# Patient Record
Sex: FEMALE | Race: WHITE | HISPANIC OR LATINO | URBAN - METROPOLITAN AREA
[De-identification: names, ages, dates, MRNs, and addresses within clinical notes are randomized per-mention and may not be internally consistent; named-entity substitution may affect disease eponyms.]

---

## 2018-07-24 ENCOUNTER — INPATIENT (INPATIENT)
Facility: HOSPITAL | Age: 48
LOS: 1 days | Discharge: HOME | DRG: 988 | End: 2018-07-26
Attending: INTERNAL MEDICINE | Admitting: INTERNAL MEDICINE
Payer: SELF-PAY

## 2018-07-24 VITALS
OXYGEN SATURATION: 100 % | HEIGHT: 66 IN | RESPIRATION RATE: 18 BRPM | SYSTOLIC BLOOD PRESSURE: 108 MMHG | TEMPERATURE: 98 F | HEART RATE: 70 BPM | WEIGHT: 154.98 LBS | DIASTOLIC BLOOD PRESSURE: 71 MMHG

## 2018-07-24 DIAGNOSIS — D64.9 ANEMIA, UNSPECIFIED: ICD-10-CM

## 2018-07-24 DIAGNOSIS — Z29.9 ENCOUNTER FOR PROPHYLACTIC MEASURES, UNSPECIFIED: ICD-10-CM

## 2018-07-24 DIAGNOSIS — N84.0 POLYP OF CORPUS UTERI: Chronic | ICD-10-CM

## 2018-07-24 DIAGNOSIS — N20.0 CALCULUS OF KIDNEY: ICD-10-CM

## 2018-07-24 DIAGNOSIS — N13.9 OBSTRUCTIVE AND REFLUX UROPATHY, UNSPECIFIED: ICD-10-CM

## 2018-07-24 DIAGNOSIS — E21.3 HYPERPARATHYROIDISM, UNSPECIFIED: ICD-10-CM

## 2018-07-24 DIAGNOSIS — D50.8 OTHER IRON DEFICIENCY ANEMIAS: ICD-10-CM

## 2018-07-24 LAB
ALBUMIN SERPL ELPH-MCNC: 3.7 G/DL — SIGNIFICANT CHANGE UP (ref 3.5–5)
ALP SERPL-CCNC: 118 U/L — SIGNIFICANT CHANGE UP (ref 40–120)
ALT FLD-CCNC: 25 U/L DA — SIGNIFICANT CHANGE UP (ref 10–60)
ANION GAP SERPL CALC-SCNC: 7 MMOL/L — SIGNIFICANT CHANGE UP (ref 5–17)
APPEARANCE UR: CLEAR — SIGNIFICANT CHANGE UP
APTT BLD: 23.7 SEC — LOW (ref 27.5–37.4)
AST SERPL-CCNC: 19 U/L — SIGNIFICANT CHANGE UP (ref 10–40)
BASOPHILS # BLD AUTO: 0.1 K/UL — SIGNIFICANT CHANGE UP (ref 0–0.2)
BASOPHILS # BLD AUTO: 0.1 K/UL — SIGNIFICANT CHANGE UP (ref 0–0.2)
BASOPHILS NFR BLD AUTO: 0.4 % — SIGNIFICANT CHANGE UP (ref 0–2)
BASOPHILS NFR BLD AUTO: 0.6 % — SIGNIFICANT CHANGE UP (ref 0–2)
BILIRUB SERPL-MCNC: 0.5 MG/DL — SIGNIFICANT CHANGE UP (ref 0.2–1.2)
BILIRUB UR-MCNC: NEGATIVE — SIGNIFICANT CHANGE UP
BUN SERPL-MCNC: 14 MG/DL — SIGNIFICANT CHANGE UP (ref 7–18)
CALCIUM SERPL-MCNC: 11.1 MG/DL — HIGH (ref 8.4–10.5)
CHLORIDE SERPL-SCNC: 108 MMOL/L — SIGNIFICANT CHANGE UP (ref 96–108)
CO2 SERPL-SCNC: 24 MMOL/L — SIGNIFICANT CHANGE UP (ref 22–31)
COLOR SPEC: YELLOW — SIGNIFICANT CHANGE UP
CREAT SERPL-MCNC: 0.77 MG/DL — SIGNIFICANT CHANGE UP (ref 0.5–1.3)
DIFF PNL FLD: ABNORMAL
EOSINOPHIL # BLD AUTO: 0.1 K/UL — SIGNIFICANT CHANGE UP (ref 0–0.5)
EOSINOPHIL # BLD AUTO: 0.3 K/UL — SIGNIFICANT CHANGE UP (ref 0–0.5)
EOSINOPHIL NFR BLD AUTO: 0.7 % — SIGNIFICANT CHANGE UP (ref 0–6)
EOSINOPHIL NFR BLD AUTO: 2.6 % — SIGNIFICANT CHANGE UP (ref 0–6)
GLUCOSE SERPL-MCNC: 120 MG/DL — HIGH (ref 70–99)
GLUCOSE UR QL: NEGATIVE — SIGNIFICANT CHANGE UP
HCG SERPL-ACNC: <1 MIU/ML — SIGNIFICANT CHANGE UP
HCT VFR BLD CALC: 24.4 % — LOW (ref 34.5–45)
HCT VFR BLD CALC: 25.9 % — LOW (ref 34.5–45)
HGB BLD-MCNC: 6.8 G/DL — CRITICAL LOW (ref 11.5–15.5)
HGB BLD-MCNC: 7.4 G/DL — LOW (ref 11.5–15.5)
HIV 1+2 AB+HIV1 P24 AG SERPL QL IA: SIGNIFICANT CHANGE UP
INR BLD: 1.04 RATIO — SIGNIFICANT CHANGE UP (ref 0.88–1.16)
IRON SATN MFR SERPL: 13 UG/DL — LOW (ref 40–150)
IRON SATN MFR SERPL: 3 % — LOW (ref 15–50)
KETONES UR-MCNC: NEGATIVE — SIGNIFICANT CHANGE UP
LEUKOCYTE ESTERASE UR-ACNC: NEGATIVE — SIGNIFICANT CHANGE UP
LYMPHOCYTES # BLD AUTO: 1.8 K/UL — SIGNIFICANT CHANGE UP (ref 1–3.3)
LYMPHOCYTES # BLD AUTO: 15 % — SIGNIFICANT CHANGE UP (ref 13–44)
LYMPHOCYTES # BLD AUTO: 2.8 K/UL — SIGNIFICANT CHANGE UP (ref 1–3.3)
LYMPHOCYTES # BLD AUTO: 28.8 % — SIGNIFICANT CHANGE UP (ref 13–44)
MCHC RBC-ENTMCNC: 17.7 PG — LOW (ref 27–34)
MCHC RBC-ENTMCNC: 18.8 PG — LOW (ref 27–34)
MCHC RBC-ENTMCNC: 27.9 GM/DL — LOW (ref 32–36)
MCHC RBC-ENTMCNC: 28.6 GM/DL — LOW (ref 32–36)
MCV RBC AUTO: 63.4 FL — LOW (ref 80–100)
MCV RBC AUTO: 65.7 FL — LOW (ref 80–100)
MONOCYTES # BLD AUTO: 0.6 K/UL — SIGNIFICANT CHANGE UP (ref 0–0.9)
MONOCYTES # BLD AUTO: 0.9 K/UL — SIGNIFICANT CHANGE UP (ref 0–0.9)
MONOCYTES NFR BLD AUTO: 6 % — SIGNIFICANT CHANGE UP (ref 2–14)
MONOCYTES NFR BLD AUTO: 7.2 % — SIGNIFICANT CHANGE UP (ref 2–14)
NEUTROPHILS # BLD AUTO: 6.1 K/UL — SIGNIFICANT CHANGE UP (ref 1.8–7.4)
NEUTROPHILS # BLD AUTO: 9.4 K/UL — HIGH (ref 1.8–7.4)
NEUTROPHILS NFR BLD AUTO: 62 % — SIGNIFICANT CHANGE UP (ref 43–77)
NEUTROPHILS NFR BLD AUTO: 76.7 % — SIGNIFICANT CHANGE UP (ref 43–77)
NITRITE UR-MCNC: NEGATIVE — SIGNIFICANT CHANGE UP
PH UR: 6.5 — SIGNIFICANT CHANGE UP (ref 5–8)
PLATELET # BLD AUTO: 396 K/UL — SIGNIFICANT CHANGE UP (ref 150–400)
PLATELET # BLD AUTO: 459 K/UL — HIGH (ref 150–400)
POTASSIUM SERPL-MCNC: 4 MMOL/L — SIGNIFICANT CHANGE UP (ref 3.5–5.3)
POTASSIUM SERPL-SCNC: 4 MMOL/L — SIGNIFICANT CHANGE UP (ref 3.5–5.3)
PROT SERPL-MCNC: 7.7 G/DL — SIGNIFICANT CHANGE UP (ref 6–8.3)
PROT UR-MCNC: NEGATIVE — SIGNIFICANT CHANGE UP
PROTHROM AB SERPL-ACNC: 11.3 SEC — SIGNIFICANT CHANGE UP (ref 9.8–12.7)
RBC # BLD: 3.85 M/UL — SIGNIFICANT CHANGE UP (ref 3.8–5.2)
RBC # BLD: 3.94 M/UL — SIGNIFICANT CHANGE UP (ref 3.8–5.2)
RBC # FLD: 15.5 % — HIGH (ref 10.3–14.5)
RBC # FLD: 16.8 % — HIGH (ref 10.3–14.5)
SODIUM SERPL-SCNC: 139 MMOL/L — SIGNIFICANT CHANGE UP (ref 135–145)
SP GR SPEC: 1.01 — SIGNIFICANT CHANGE UP (ref 1.01–1.02)
TIBC SERPL-MCNC: 468 UG/DL — HIGH (ref 250–450)
TSH SERPL-MCNC: 0.6 UU/ML — SIGNIFICANT CHANGE UP (ref 0.34–4.82)
UIBC SERPL-MCNC: 455 UG/DL — HIGH (ref 110–370)
UROBILINOGEN FLD QL: NEGATIVE — SIGNIFICANT CHANGE UP
WBC # BLD: 12.2 K/UL — HIGH (ref 3.8–10.5)
WBC # BLD: 9.9 K/UL — SIGNIFICANT CHANGE UP (ref 3.8–10.5)
WBC # FLD AUTO: 12.2 K/UL — HIGH (ref 3.8–10.5)
WBC # FLD AUTO: 9.9 K/UL — SIGNIFICANT CHANGE UP (ref 3.8–10.5)

## 2018-07-24 PROCEDURE — 99285 EMERGENCY DEPT VISIT HI MDM: CPT | Mod: 25

## 2018-07-24 PROCEDURE — 99223 1ST HOSP IP/OBS HIGH 75: CPT | Mod: GC

## 2018-07-24 PROCEDURE — 74177 CT ABD & PELVIS W/CONTRAST: CPT | Mod: 26

## 2018-07-24 PROCEDURE — 71045 X-RAY EXAM CHEST 1 VIEW: CPT | Mod: 26

## 2018-07-24 RX ORDER — SODIUM CHLORIDE 9 MG/ML
3 INJECTION INTRAMUSCULAR; INTRAVENOUS; SUBCUTANEOUS ONCE
Qty: 0 | Refills: 0 | Status: COMPLETED | OUTPATIENT
Start: 2018-07-24 | End: 2018-07-24

## 2018-07-24 RX ORDER — IRON SUCROSE 20 MG/ML
200 INJECTION, SOLUTION INTRAVENOUS
Qty: 0 | Refills: 0 | Status: DISCONTINUED | OUTPATIENT
Start: 2018-07-25 | End: 2018-07-25

## 2018-07-24 RX ORDER — MORPHINE SULFATE 50 MG/1
4 CAPSULE, EXTENDED RELEASE ORAL ONCE
Qty: 0 | Refills: 0 | Status: DISCONTINUED | OUTPATIENT
Start: 2018-07-24 | End: 2018-07-24

## 2018-07-24 RX ORDER — MORPHINE SULFATE 50 MG/1
6 CAPSULE, EXTENDED RELEASE ORAL ONCE
Qty: 0 | Refills: 0 | Status: DISCONTINUED | OUTPATIENT
Start: 2018-07-24 | End: 2018-07-24

## 2018-07-24 RX ORDER — MORPHINE SULFATE 50 MG/1
1 CAPSULE, EXTENDED RELEASE ORAL EVERY 4 HOURS
Qty: 0 | Refills: 0 | Status: DISCONTINUED | OUTPATIENT
Start: 2018-07-24 | End: 2018-07-26

## 2018-07-24 RX ORDER — TAMSULOSIN HYDROCHLORIDE 0.4 MG/1
0.4 CAPSULE ORAL DAILY
Qty: 0 | Refills: 0 | Status: DISCONTINUED | OUTPATIENT
Start: 2018-07-24 | End: 2018-07-25

## 2018-07-24 RX ORDER — FAMOTIDINE 10 MG/ML
20 INJECTION INTRAVENOUS
Qty: 0 | Refills: 0 | Status: DISCONTINUED | OUTPATIENT
Start: 2018-07-24 | End: 2018-07-26

## 2018-07-24 RX ORDER — ONDANSETRON 8 MG/1
4 TABLET, FILM COATED ORAL ONCE
Qty: 0 | Refills: 0 | Status: COMPLETED | OUTPATIENT
Start: 2018-07-24 | End: 2018-07-24

## 2018-07-24 RX ORDER — KETOROLAC TROMETHAMINE 30 MG/ML
15 SYRINGE (ML) INJECTION EVERY 6 HOURS
Qty: 0 | Refills: 0 | Status: DISCONTINUED | OUTPATIENT
Start: 2018-07-24 | End: 2018-07-26

## 2018-07-24 RX ORDER — SODIUM CHLORIDE 9 MG/ML
1000 INJECTION INTRAMUSCULAR; INTRAVENOUS; SUBCUTANEOUS
Qty: 0 | Refills: 0 | Status: DISCONTINUED | OUTPATIENT
Start: 2018-07-24 | End: 2018-07-26

## 2018-07-24 RX ORDER — SODIUM CHLORIDE 9 MG/ML
1000 INJECTION INTRAMUSCULAR; INTRAVENOUS; SUBCUTANEOUS ONCE
Qty: 0 | Refills: 0 | Status: COMPLETED | OUTPATIENT
Start: 2018-07-24 | End: 2018-07-24

## 2018-07-24 RX ADMIN — MORPHINE SULFATE 6 MILLIGRAM(S): 50 CAPSULE, EXTENDED RELEASE ORAL at 08:28

## 2018-07-24 RX ADMIN — SODIUM CHLORIDE 75 MILLILITER(S): 9 INJECTION INTRAMUSCULAR; INTRAVENOUS; SUBCUTANEOUS at 16:47

## 2018-07-24 RX ADMIN — FAMOTIDINE 20 MILLIGRAM(S): 10 INJECTION INTRAVENOUS at 22:13

## 2018-07-24 RX ADMIN — ONDANSETRON 4 MILLIGRAM(S): 8 TABLET, FILM COATED ORAL at 05:13

## 2018-07-24 RX ADMIN — MORPHINE SULFATE 4 MILLIGRAM(S): 50 CAPSULE, EXTENDED RELEASE ORAL at 05:15

## 2018-07-24 RX ADMIN — MORPHINE SULFATE 6 MILLIGRAM(S): 50 CAPSULE, EXTENDED RELEASE ORAL at 08:58

## 2018-07-24 RX ADMIN — MORPHINE SULFATE 4 MILLIGRAM(S): 50 CAPSULE, EXTENDED RELEASE ORAL at 06:07

## 2018-07-24 RX ADMIN — TAMSULOSIN HYDROCHLORIDE 0.4 MILLIGRAM(S): 0.4 CAPSULE ORAL at 12:35

## 2018-07-24 RX ADMIN — SODIUM CHLORIDE 3 MILLILITER(S): 9 INJECTION INTRAMUSCULAR; INTRAVENOUS; SUBCUTANEOUS at 05:05

## 2018-07-24 RX ADMIN — SODIUM CHLORIDE 75 MILLILITER(S): 9 INJECTION INTRAMUSCULAR; INTRAVENOUS; SUBCUTANEOUS at 14:03

## 2018-07-24 RX ADMIN — SODIUM CHLORIDE 1000 MILLILITER(S): 9 INJECTION INTRAMUSCULAR; INTRAVENOUS; SUBCUTANEOUS at 05:05

## 2018-07-24 NOTE — ED ADULT NURSE NOTE - ED STAT RN HANDOFF DETAILS
[id number] patient at 7 from HENRY Freedman schedule for CT scan. Patient c/o right sided pain on pain scale 8/10 Dr. Morales to order medication to be administered. Abdomen soft tender to palpation. Patient not admitted as yet continue to monitor patient.

## 2018-07-24 NOTE — ED ADULT NURSE NOTE - ED STAT RN HANDOFF DETAILS 2
Patient admitted to medicine 5S 515, report given to HENRY Conte. Patient s/p 1 unit of PRBC no reaction noted, B/P low patient asymptomatic Dr. Tafoya made aware. Patient to be transfer to floor by transporter in no acute distress.

## 2018-07-24 NOTE — H&P ADULT - PROBLEM SELECTOR PLAN 2
Patient p/w R flank pain + chills + vomiting  CT A/P: 0.4 cm distal right ureter stone causing moderate right   hydronephrosis. Small bilateral non-obstructing renal calculi measuring up to 0.3 cm in the right lower pole and 0.2 cm in the left lower pole. Several small renal cysts.  c/w Flomax + IV hydration + pain management  UA negative  Urology consult Patient p/w R flank pain + chills + vomiting  CT A/P: 0.4 cm distal right ureter stone causing moderate right   hydronephrosis. Small bilateral non-obstructing renal calculi measuring up to 0.3 cm in the right lower pole and 0.2 cm in the left lower pole. Several small renal cysts.  c/w Flomax + IV hydration + pain management  UA negative, isolated mild leukocytosis likely reactive; afebrile  Urology consult Patient p/w R flank pain + chills + vomiting  CT A/P: 0.4 cm distal right ureter stone causing moderate right   hydronephrosis. Small bilateral non-obstructing renal calculi measuring up to 0.3 cm in the right lower pole and 0.2 cm in the left lower pole. Several small renal cysts.  c/w Flomax + IV hydration + pain management  UA negative, isolated mild leukocytosis likely reactive; afebrile  Urology consulted

## 2018-07-24 NOTE — H&P ADULT - PROBLEM SELECTOR PLAN 1
Patient p/w fatigue, weakness, dyspnea on exertion for several months  Hx uterine polyps, heavy menstrual periods  H/H: 6.8/24.4; MCV: 63.4, MCH: 17.7 and RDW: 15.5: Iron deficiency anemia picture  Follow up anemia panel, FOBT  Will transfuse 1U PRBC  CT A/P: fibroid uterus

## 2018-07-24 NOTE — H&P ADULT - ASSESSMENT
46yo  female, PMHx uterine polyps (removed ~2yrs ago), anemia, hyperparathyroidism, ?neck clot (on Coumadin 5mg 15 yrs ago), who presented to ED c/o worsening right flank pain (9/10) and vomiting. Patient refers symptoms started 2 weeks ago, and worsened three days ago. Refers chills, diaphoresis, nausea/vomiting, and previous history of nephrolithiasis.      Patient admitted for symptomatic anemia and nephrolithiasis.

## 2018-07-24 NOTE — PATIENT PROFILE ADULT. - VISION (WITH CORRECTIVE LENSES IF THE PATIENT USUALLY WEARS THEM):
blurry vision/Partially impaired: cannot see medication labels or newsprint, but can see obstacles in path, and the surrounding layout; can count fingers at arm's length

## 2018-07-24 NOTE — CONSULT NOTE ADULT - ASSESSMENT
47 year old female right ureteral calculi causing moderate hydronephrosis   1. Cont flomax  2. Please send urine culture  3. Hydration  4. Strain urine  5. Anemia management as per medicine  6. Pt will need follow-up for surgical management of primary hyperparathyroidism 47 year old female right ureteral stone, bilateral intrarenal stones, right flank pain    1. Cont flomax  2. Please send urine culture  3. Hydration  4. Strain urine  5. Anemia management as per medicine  6. CT images reviewed  7. Labs reviewed  8. Case discussed with PA staff and medicine team  9. Given pain, will do ureteroscopy with possible laser lithotripsy, stone extraction, stent placement

## 2018-07-24 NOTE — ED PROVIDER NOTE - OBJECTIVE STATEMENT
48 y/o F pt with a significant PMHx of Anemia and Perithyroiditis, and no significant PSHx presents to ED with intermittent mild lower quadrant pain for x2 weeks. Patient reports pain being persistent and severe last night. Patient states she experienced nausea and vomiting x2, non bloody non bilious. Patient denies fever, chest pain, shortness of breath, diarrhea, dysuria urgency frequency, or any other complaints. Patient had a specific  [place]. Patient notes pain radiating to right proximal thigh. 48 y/o F pt with a significant PMHx of Anemia and Perithyroiditis, and no significant PSHx presents to ED with intermittent mild lower quadrant pain for x2 weeks. Patient reports pain being persistent and severe last night. Patient states she experienced nausea and vomiting x2, non bloody, non bilious. Patient denies fever, chest pain, shortness of breath, diarrhea, dysuria, urgency, frequency, or any other complaints. Patient had a specific  [place]iny[place]. Patient notes pain radiating to right proximal thigh.

## 2018-07-24 NOTE — H&P ADULT - ATTENDING COMMENTS
Patient seen and examined; Agree with PGY2 MAR A/P above with editing as needed. Discussed with MAR Dr. Ledesma    48yo  female, PMHx uterine polyps (removed ~2yrs ago), anemia, hyperparathyroidism, ?neck clot (on Coumadin 5mg 15 yrs ago), who presented to ED c/o worsening right flank pain (9/10) and vomiting. Patient refers symptoms started 2 weeks ago, and worsened three days ago. Refers chills, diaphoresis, nausea/vomiting, and previous history of nephrolithiasis. Denies fever, dysuria, hematuria, diarrhea. Patient mentions drinking 2L water daily at least and denies consumption of soda. Also mentions taking multivitamin supplements on a daily basis.     Upon further questioning, patient mentions being diagnosed with hyperparathyroidism (2 years ago) for which surgery was recommended, however, patient lost medical insurance and was unable to schedule surgery.    Patient endorses arthralgias, intermittent abdominal pain, bone pain as well. Regarding menstrual cycle, patient refers LMP 7 days ago, which usually last 8 days with heavy flow (2 pad packets/ 3 days). Refers weakness, fatigue, dizziness and SOB upon exertion. Last time patient followed up with PCP was 2 years ago. Patient currently on B12 supplements and multivitamins.    Her Right flank pain has resolved with Morphine in ED; She was found to have a low H/H; Hx Transfusion a year ago. Has heavy menstrual losses    FH/SH: As above;     Vital Signs Last 24 Hrs  T(C): 36.4 (24 Jul 2018 13:03), Max: 36.9 (24 Jul 2018 07:36)  T(F): 97.6 (24 Jul 2018 13:03), Max: 98.4 (24 Jul 2018 07:36)  HR: 69 (24 Jul 2018 13:03) (64 - 74)  BP: 94/45 (24 Jul 2018 13:03) (94/45 - 114/56)  RR: 18 (24 Jul 2018 13:03) (18 - 18)  SpO2: 99% (24 Jul 2018 13:03) (99% - 100%)    P/E:  Neuro: AAO x3; No focal; deficits  CVS: S1S2 present, regular  Resp: BLAE+, No wheeze or Rhonchi  GI: Soft, BS+, NT, ND Patient seen and examined; Agree with PGY2 MAR A/P above with editing as needed. Discussed with MAR Dr. Ledesma    46yo  female, PMHx uterine polyps (removed ~2yrs ago), anemia, hyperparathyroidism, ?neck clot (on Coumadin 5mg 15 yrs ago), who presented to ED c/o worsening right flank pain (9/10) and vomiting. Patient refers symptoms started 2 weeks ago, and worsened three days ago. Refers chills, diaphoresis, nausea/vomiting, and previous history of nephrolithiasis. Denies fever, dysuria, hematuria, diarrhea. Patient mentions drinking 2L water daily at least and denies consumption of soda. Also mentions taking multivitamin supplements on a daily basis.     Upon further questioning, patient mentions being diagnosed with hyperparathyroidism (2 years ago) for which surgery was recommended, however, patient lost medical insurance and was unable to schedule surgery.    Patient endorses arthralgias, intermittent abdominal pain, bone pain as well. Regarding menstrual cycle, patient refers LMP 7 days ago, which usually last 8 days with heavy flow (2 pad packets/ 3 days). Refers weakness, fatigue, dizziness and SOB upon exertion. Last time patient followed up with PCP was 2 years ago. Patient currently on B12 supplements and multivitamins.    Her Right flank pain has resolved with Morphine in ED; She was found to have a low H/H; Hx Transfusion a year ago. Has heavy menstrual losses    FH/SH: As above;     Vital Signs Last 24 Hrs  T(C): 36.4 (24 Jul 2018 13:03), Max: 36.9 (24 Jul 2018 07:36)  T(F): 97.6 (24 Jul 2018 13:03), Max: 98.4 (24 Jul 2018 07:36)  HR: 69 (24 Jul 2018 13:03) (64 - 74)  BP: 94/45 (24 Jul 2018 13:03) (94/45 - 114/56)  RR: 18 (24 Jul 2018 13:03) (18 - 18)  SpO2: 99% (24 Jul 2018 13:03) (99% - 100%)    P/E:  Neuro: AAO x3; No focal; deficits  CVS: S1S2 present, regular  Resp: BLAE+, No wheeze or Rhonchi  GI: Soft, BS+, NT, ND  Extr: No edema or calf tenderness B/L LE    Labs:                        6.8    12.2  )-----------( 459      ( 24 Jul 2018 05:08 )             24.4   07-24    139  |  108  |  14  ----------------------------<  120<H>  4.0   |  24  |  0.77    Ca    11.1<H>      24 Jul 2018 05:08    TPro  7.7  /  Alb  3.7  /  TBili  0.5  /  DBili  x   /  AST  19  /  ALT  25  /  AlkPhos  118  07-24    D/D;  Symptomatic Anemia due to likely Iron deficiency due to Menorrhagia due to excessive Menstrual loss  Right Ureteral obstructing stone with Moderate Hydronephrosis  Hypercalcemia and Renal Stones likely  due to Hyperparathyroidism   Overweight    Plan:  Medicine; Typed and crossed and already getting a unit of blood in ED  Iron studies send prior to Transfusion  Will place on Venofer after a unit PRBC  Transfuse only if H/H less than 8/25 after PRBC  Urology eval for Dr. Wei d/w COURTNEY Hughes  IVF post PRBC    Patient needs to follow up with Gyn as outpatient for possible Myomectomy which will likely cure her Iron deficiency  Patient lacks Insurance  Will d/w  to arrange outpatient appointment at [facility]. She will also need Parathyroidectomy.     Discussed with patient findings and plan of care  Discussed with HENRY Tipton  Discussed with LAYLA Ledesma Patient seen and examined; Agree with PGY2 MAR A/P above with editing as needed. Discussed with MAR Dr. Ledesma    46yo  female, PMHx uterine polyps (removed ~2yrs ago), anemia, hyperparathyroidism, ?neck clot (on Coumadin 5mg 15 yrs ago), who presented to ED c/o worsening right flank pain (9/10) and vomiting. Patient refers symptoms started 2 weeks ago, and worsened three days ago. Refers chills, diaphoresis, nausea/vomiting, and previous history of nephrolithiasis. Denies fever, dysuria, hematuria, diarrhea. Patient mentions drinking 2L water daily at least and denies consumption of soda. Also mentions taking multivitamin supplements on a daily basis.     Upon further questioning, patient mentions being diagnosed with hyperparathyroidism (2 years ago) for which surgery was recommended, however, patient lost medical insurance and was unable to schedule surgery.    Patient endorses arthralgias, intermittent abdominal pain, bone pain as well. Regarding menstrual cycle, patient refers LMP 7 days ago, which usually last 8 days with heavy flow (2 pad packets/ 3 days). Refers weakness, fatigue, dizziness and SOB upon exertion. Last time patient followed up with PCP was 2 years ago. Patient currently on B12 supplements and multivitamins.    Her Right flank pain has resolved with Morphine in ED; She was found to have a low H/H; Hx Transfusion a year ago. Has heavy menstrual losses    FH/SH: As above;     Vital Signs Last 24 Hrs  T(C): 36.4 (24 Jul 2018 13:03), Max: 36.9 (24 Jul 2018 07:36)  T(F): 97.6 (24 Jul 2018 13:03), Max: 98.4 (24 Jul 2018 07:36)  HR: 69 (24 Jul 2018 13:03) (64 - 74)  BP: 94/45 (24 Jul 2018 13:03) (94/45 - 114/56)  RR: 18 (24 Jul 2018 13:03) (18 - 18)  SpO2: 99% (24 Jul 2018 13:03) (99% - 100%)    P/E:  Neuro: AAO x3; No focal; deficits  CVS: S1S2 present, regular  Resp: BLAE+, No wheeze or Rhonchi  GI: Soft, BS+, NT, ND  Extr: No edema or calf tenderness B/L LE    Labs:                        6.8    12.2  )-----------( 459      ( 24 Jul 2018 05:08 )             24.4   07-24    139  |  108  |  14  ----------------------------<  120<H>  4.0   |  24  |  0.77    Ca    11.1<H>      24 Jul 2018 05:08    TPro  7.7  /  Alb  3.7  /  TBili  0.5  /  DBili  x   /  AST  19  /  ALT  25  /  AlkPhos  118  07-24    D/D;  Symptomatic Anemia due to likely Iron deficiency due to Menorrhagia due to excessive Menstrual loss  Right Ureteral obstructing stone with Moderate Hydronephrosis  Hypercalcemia and Renal Stones likely  due to Hyperparathyroidism   Leucocytosis reactive due to Anemia  Overweight    Plan:  Medicine; Typed and crossed and already getting a unit of blood in ED  Iron studies send prior to Transfusion  Will place on Venofer after a unit PRBC  Transfuse only if H/H less than 8/25 after PRBC  Urology eval for Dr. Wei d/w COURTNEY Hughes  IVF post PRBC    Patient needs to follow up with Gyn as outpatient for possible Myomectomy which will likely cure her Iron deficiency  Patient lacks Insurance  Will d/w  to arrange [facility] appointment [facility]. She will also need Parathyroidectomy.     Discussed with patient findings and plan of care  Discussed with HENRY Tipton  Discussed with LAYLA Ledesma

## 2018-07-24 NOTE — H&P ADULT - HISTORY OF PRESENT ILLNESS
46yo  female, PMHx uterine polyps (removed ~2yrs ago), anemia, hyperparathyroidism, ?neck clot (on Coumadin 5mg 15 yrs ago), who presented to ED c/o worsening right flank pain (9/10) and vomiting. Patient refers symptoms started 2 weeks ago, and worsened three days ago. Refers chills, diaphoresis, nausea/vomiting, and previous history of nephrolithiasis. Denies fever, dysuria, hematuria, diarrhea. Patient mentions drinking 2L water daily at least and denies consumption of soda. Also mentions taking multivitamin supplements on a daily basis. Upon further questioning, patient mentions being diagnosed with hyperparathyroidism (2 years ago) for which surgery was recommended, however, patient lost medical insurance and was unable to schedule surgery.  Patient endorses arthralgias, intermittent abdominal pain, bone pain as well. Regarding menstrual cycle, patient refers LMP 7 days ago, which usually last 8 days with heavy flow (2 pad packets/ 3 days). Refers weakness, fatigue, dizziness and SOB upon exertion. Last time patient followed up with PCP was 2 years ago. Patient currently on B12 supplements and multivitamins.

## 2018-07-24 NOTE — ED ADULT TRIAGE NOTE - CHIEF COMPLAINT QUOTE
pt stated she has right side abd pain going to the right side of her back and down her right leg that started 2 weeks and yesterday the pain got worse.

## 2018-07-24 NOTE — PROGRESS NOTE ADULT - SUBJECTIVE AND OBJECTIVE BOX
Surgery Pre-op Note    Preoperative Diagnosis: right ureteral calculus causing hydronephrosis    Planned Procedure: Cystoscopy, insertion of right ureteral stent                        6.8    12.2  )-----------( 459      ( 24 Jul 2018 05:08 )             24.4     07-24    139  |  108  |  14  ----------------------------<  120<H>  4.0   |  24  |  0.77    Ca    11.1<H>      24 Jul 2018 05:08    TPro  7.7  /  Alb  3.7  /  TBili  0.5  /  DBili  x   /  AST  19  /  ALT  25  /  AlkPhos  118  07-24    LIVER FUNCTIONS - ( 24 Jul 2018 05:08 )  Alb: 3.7 g/dL / Pro: 7.7 g/dL / ALK PHOS: 118 U/L / ALT: 25 U/L DA / AST: 19 U/L / GGT: x           PT/INR - ( 24 Jul 2018 09:19 )   PT: 11.3 sec;   INR: 1.04 ratio       PTT - ( 24 Jul 2018 09:19 )  PTT:23.7 sec    Type & Screen: ABO RH Interpretation: B POS (07.24.18 @ 09:29)    EKG: Ordered    CXR: Ordered

## 2018-07-24 NOTE — CONSULT NOTE ADULT - SUBJECTIVE AND OBJECTIVE BOX
Urology  Consultation Note    Patient is a 47y old  Female who presents with a chief complaint of right flank pain (2018 10:29)      HPI:   48 y/o female with significant PMH of hyperparathyroidism, kidney stones, uterine polyps and anemia presents with right flank pain x 2 weeks. She states the pain was 8/10 and has gotten worse recently. She also admits to generalized bone pain. She states she had chills and diaphoresis. Admits to nausea and vomiting.  She denies fever or blood in the urine. She was told about her hyperparathyroidism and was recommended surgery but did not have insurance to go on with her surgery. She also notes that she naturally passed her kidney stones in the past.       PAST MEDICAL & SURGICAL HISTORY:  Hyperparathyroidism  Uterine polyp  Anemia  Uterine polyp: uterine polyp removal      Allergies    No Known Allergies    MEDICATIONS  (STANDING):  sodium chloride 0.9%. 1000 milliLiter(s) (75 mL/Hr) IV Continuous <Continuous>  tamsulosin 0.4 milliGRAM(s) Oral daily    MEDICATIONS  (PRN):  ketorolac   Injectable 15 milliGRAM(s) IV Push every 6 hours PRN Moderate Pain (4 - 6)  morphine  - Injectable 1 milliGRAM(s) IV Push every 4 hours PRN Severe Pain (7 - 10)      Vital Signs Last 24 Hrs  T(C): 36.7 (2018 11:03), Max: 36.9 (2018 07:36)  T(F): 98 (2018 11:03), Max: 98.4 (2018 07:36)  HR: 64 (2018 11:03) (64 - 74)  BP: 103/48 (2018 11:03) (103/48 - 114/56)  BP(mean): --  RR: 18 (2018 11:03) (18 - 18)  SpO2: 100% (2018 11:03) (99% - 100%)    Physical Exam:  Gen: AAOx3, NAD  Abd: soft, non-distended, mildly tender on the right side, no rebound, no guarding  Back: no CVA tenderness bilaterally    Labs:                          6.8    12.2  )-----------( 459      ( 2018 05:08 )             24.4     07-24    139  |  108  |  14  ----------------------------<  120<H>  4.0   |  24  |  0.77    Ca    11.1<H>      2018 05:08    TPro  7.7  /  Alb  3.7  /  TBili  0.5  /  DBili  x   /  AST  19  /  ALT  25  /  AlkPhos  118  07-    PT/INR - ( 2018 09:19 )   PT: 11.3 sec;   INR: 1.04 ratio         PTT - ( 2018 09:19 )  PTT:23.7 sec  Urinalysis Basic - ( 2018 05:08 )    Color: Yellow / Appearance: Clear / S.015 / pH: x  Gluc: x / Ketone: Negative  / Bili: Negative / Urobili: Negative   Blood: x / Protein: Negative / Nitrite: Negative   Leuk Esterase: Negative / RBC: 2-5 /HPF / WBC 0-2 /HPF   Sq Epi: x / Non Sq Epi: x / Bacteria: x        Radiological Exams:  < from: CT Abdomen and Pelvis w/ Oral Cont and w/ IV Cont (18 @ 07:29) >    EXAM:  CT ABDOMEN AND PELVIS OC IC                            PROCEDURE DATE:  2018          INTERPRETATION:  CLINICAL INFORMATION: Right lower quadrant pain    COMPARISON: None    PROCEDURE:   CT of the Abdomen and Pelvis was performed with intravenous contrast.   Intravenous contrast: 90 ml Omnipaque 350. 10 ml discarded.  Oral contrast: positive contrast was administered.  Sagittal and coronal reformats were performed.    FINDINGS:    LOWER CHEST: Within normal limits.    LIVER: Withinnormal limits.  BILE DUCTS: Normal caliber.  GALLBLADDER: Within normal limits.  SPLEEN: Within normal limits.  PANCREAS: Within normal limits.  ADRENALS: Within normal limits.  KIDNEYS/URETERS: 0.4 cm distal right ureter stone causing moderate right  hydronephrosis. Additional small bilateral nonobstructing renal calculi   measuring up to 0.3 cm in the right lower pole and 0.2 cm in the left   lower pole. Several small renal cysts.    BLADDER: Within normal limits.  REPRODUCTIVE ORGANS: Fibroiduterus. No adnexal mass.    BOWEL: No bowel obstruction. Appendix normal.  PERITONEUM: No ascites.  VESSELS:  Within normal limits.  RETROPERITONEUM: No lymphadenopathy.    ABDOMINAL WALL: Within normal limits.  BONES: Within normal limits.    IMPRESSION: 0.4 cm distal right ureter stone causing moderate right   hydronephrosis.                     YANA ASKEW M.D., ATTENDING RADIOLOGIST  This document has been electronically signed. 2018  8:15AM                < end of copied text > Urology  Consultation Note    Patient is a 47y old  Female who presents with a chief complaint of right flank pain (2018 10:29)      HPI:   48 y/o female with significant PMH of hyperparathyroidism, kidney stones, uterine polyps and anemia presents with right flank pain x 2 weeks. She states the pain was 8/10 and has gotten worse recently. She also admits to generalized bone pain. She states she had chills and diaphoresis. Admits to nausea and vomiting.  She denies fever or blood in the urine. She was told about her hyperparathyroidism and was recommended surgery but did not have insurance. She also notes that she has passed kidney stones in the past.       PAST MEDICAL & SURGICAL HISTORY:  Hyperparathyroidism  Uterine polyp  Anemia  Kidney stones  Uterine polyp: uterine polyp removal    FH: No family history of stones  SH: Quit smoking 8 years ago    Allergies  No Known Allergies    MEDICATIONS  (STANDING):  sodium chloride 0.9%. 1000 milliLiter(s) (75 mL/Hr) IV Continuous <Continuous>  tamsulosin 0.4 milliGRAM(s) Oral daily    MEDICATIONS  (PRN):  ketorolac   Injectable 15 milliGRAM(s) IV Push every 6 hours PRN Moderate Pain (4 - 6)  morphine  - Injectable 1 milliGRAM(s) IV Push every 4 hours PRN Severe Pain (7 - 10)      Vital Signs Last 24 Hrs  T(C): 36.7 (2018 11:03), Max: 36.9 (2018 07:36)  T(F): 98 (2018 11:03), Max: 98.4 (2018 07:36)  HR: 64 (2018 11:03) (64 - 74)  BP: 103/48 (2018 11:03) (103/48 - 114/56)  BP(mean): --  RR: 18 (2018 11:03) (18 - 18)  SpO2: 100% (2018 11:03) (99% - 100%)        Labs:                          6.8    12.2  )-----------( 459      ( 2018 05:08 )             24.4     07-24    139  |  108  |  14  ----------------------------<  120<H>  4.0   |  24  |  0.77    Ca    11.1<H>      2018 05:08    TPro  7.7  /  Alb  3.7  /  TBili  0.5  /  DBili  x   /  AST  19  /  ALT  25  /  AlkPhos  118  07-24    PT/INR - ( 2018 09:19 )   PT: 11.3 sec;   INR: 1.04 ratio         PTT - ( 2018 09:19 )  PTT:23.7 sec  Urinalysis Basic - ( 2018 05:08 )    Color: Yellow / Appearance: Clear / S.015 / pH: x  Gluc: x / Ketone: Negative  / Bili: Negative / Urobili: Negative   Blood: x / Protein: Negative / Nitrite: Negative   Leuk Esterase: Negative / RBC: 2-5 /HPF / WBC 0-2 /HPF   Sq Epi: x / Non Sq Epi: x / Bacteria: x        Radiological Exams:  < from: CT Abdomen and Pelvis w/ Oral Cont and w/ IV Cont (18 @ 07:29) >    EXAM:  CT ABDOMEN AND PELVIS OC IC                            PROCEDURE DATE:  2018          INTERPRETATION:  CLINICAL INFORMATION: Right lower quadrant pain    COMPARISON: None    PROCEDURE:   CT of the Abdomen and Pelvis was performed with intravenous contrast.   Intravenous contrast: 90 ml Omnipaque 350. 10 ml discarded.  Oral contrast: positive contrast was administered.  Sagittal and coronal reformats were performed.    FINDINGS:    LOWER CHEST: Within normal limits.    LIVER: Withinnormal limits.  BILE DUCTS: Normal caliber.  GALLBLADDER: Within normal limits.  SPLEEN: Within normal limits.  PANCREAS: Within normal limits.  ADRENALS: Within normal limits.  KIDNEYS/URETERS: 0.4 cm distal right ureter stone causing moderate right  hydronephrosis. Additional small bilateral nonobstructing renal calculi   measuring up to 0.3 cm in the right lower pole and 0.2 cm in the left   lower pole. Several small renal cysts.    BLADDER: Within normal limits.  REPRODUCTIVE ORGANS: Fibroiduterus. No adnexal mass.    BOWEL: No bowel obstruction. Appendix normal.  PERITONEUM: No ascites.  VESSELS:  Within normal limits.  RETROPERITONEUM: No lymphadenopathy.    ABDOMINAL WALL: Within normal limits.  BONES: Within normal limits.    IMPRESSION: 0.4 cm distal right ureter stone causing moderate right   hydronephrosis.                     YANA ASKEW M.D., ATTENDING RADIOLOGIST  This document has been electronically signed. 2018  8:15AM                < end of copied text >

## 2018-07-24 NOTE — H&P ADULT - PROBLEM SELECTOR PLAN 3
Patient refers being diagnosed couple of years back, recommended to have surgery but got uninsured and was unable to follow up.  Might explain recurrence nephrolithiasis  Follow up PTH

## 2018-07-24 NOTE — PROGRESS NOTE ADULT - ASSESSMENT
47 year old female with moderate right hydronephrosis secondary to distal right ureteral calculus. Planned for OR 7/25 for cystoscopy and right ureteral stent insertion. Anemia.     - NPO after midnight  - patient received 1 unit PRBC, recommend transfusing at least 1 more unit prior to OR for optimization   - 1 U PRBC on hold for OR  - Monitor H/H, repeat CBC in AM  - EKG and CXR ordered, will follow results  - continue Flomax and IV hydration

## 2018-07-24 NOTE — H&P ADULT - NSHPSOCIALHISTORY_GEN_ALL_CORE
, Lives with children (30,28,15), works at restaurant 3 days a week, quitted smoking 8 yrs ago (3 cigarettes per day), drinks etoh socially

## 2018-07-24 NOTE — ED PROVIDER NOTE - MEDICAL DECISION MAKING DETAILS
Patient with RLQ pain, appendicitis vs ovarian pathophysiology. suspicion for ovarian torsion low given pt lack of distress and gradual onset of pain. Will obtain CT of abdomen/ pelvis, will reassess.

## 2018-07-24 NOTE — ED PROVIDER NOTE - PROGRESS NOTE DETAILS
Patient resting comfortably, feels mildly improved. Patient reports she had a blood transfusion 3 months ago at [facility]. Her anemia is believed ot be due to heavy periods. LMP 7/6, lasted 8 days. Has been feeling lightheaded when she stands up. Consents to blood transfusion. Patient signed out to Dr. Marsh. Awaiting result of CT abdomen/pelvis. Even if negative, will admit for blood transfusion.

## 2018-07-25 LAB
24R-OH-CALCIDIOL SERPL-MCNC: 19 NG/ML — LOW (ref 30–80)
ANION GAP SERPL CALC-SCNC: 7 MMOL/L — SIGNIFICANT CHANGE UP (ref 5–17)
BASOPHILS # BLD AUTO: 0.1 K/UL — SIGNIFICANT CHANGE UP (ref 0–0.2)
BASOPHILS NFR BLD AUTO: 1.1 % — SIGNIFICANT CHANGE UP (ref 0–2)
BUN SERPL-MCNC: 8 MG/DL — SIGNIFICANT CHANGE UP (ref 7–18)
CALCIUM SERPL-MCNC: 10 MG/DL — SIGNIFICANT CHANGE UP (ref 8.4–10.5)
CALCIUM SERPL-MCNC: 10.7 MG/DL — HIGH (ref 8.4–10.5)
CHLORIDE SERPL-SCNC: 113 MMOL/L — HIGH (ref 96–108)
CHOLEST SERPL-MCNC: 137 MG/DL — SIGNIFICANT CHANGE UP (ref 10–199)
CO2 SERPL-SCNC: 23 MMOL/L — SIGNIFICANT CHANGE UP (ref 22–31)
CREAT SERPL-MCNC: 0.52 MG/DL — SIGNIFICANT CHANGE UP (ref 0.5–1.3)
EOSINOPHIL # BLD AUTO: 0.3 K/UL — SIGNIFICANT CHANGE UP (ref 0–0.5)
EOSINOPHIL NFR BLD AUTO: 4.3 % — SIGNIFICANT CHANGE UP (ref 0–6)
FERRITIN SERPL-MCNC: 3 NG/ML — LOW (ref 15–150)
GLUCOSE SERPL-MCNC: 90 MG/DL — SIGNIFICANT CHANGE UP (ref 70–99)
HAPTOGLOB SERPL-MCNC: 124 MG/DL — SIGNIFICANT CHANGE UP (ref 34–200)
HBA1C BLD-MCNC: 5.4 % — SIGNIFICANT CHANGE UP (ref 4–5.6)
HCT VFR BLD CALC: 25.2 % — LOW (ref 34.5–45)
HDLC SERPL-MCNC: 40 MG/DL — SIGNIFICANT CHANGE UP (ref 40–125)
HGB BLD-MCNC: 7.2 G/DL — LOW (ref 11.5–15.5)
LDH SERPL L TO P-CCNC: 127 U/L — SIGNIFICANT CHANGE UP (ref 120–225)
LIPID PNL WITH DIRECT LDL SERPL: 80 MG/DL — SIGNIFICANT CHANGE UP
LYMPHOCYTES # BLD AUTO: 2.6 K/UL — SIGNIFICANT CHANGE UP (ref 1–3.3)
LYMPHOCYTES # BLD AUTO: 37 % — SIGNIFICANT CHANGE UP (ref 13–44)
MAGNESIUM SERPL-MCNC: 2 MG/DL — SIGNIFICANT CHANGE UP (ref 1.6–2.6)
MCHC RBC-ENTMCNC: 18.4 PG — LOW (ref 27–34)
MCHC RBC-ENTMCNC: 28.4 GM/DL — LOW (ref 32–36)
MCV RBC AUTO: 64.9 FL — LOW (ref 80–100)
MONOCYTES # BLD AUTO: 0.5 K/UL — SIGNIFICANT CHANGE UP (ref 0–0.9)
MONOCYTES NFR BLD AUTO: 7 % — SIGNIFICANT CHANGE UP (ref 2–14)
NEUTROPHILS # BLD AUTO: 3.5 K/UL — SIGNIFICANT CHANGE UP (ref 1.8–7.4)
NEUTROPHILS NFR BLD AUTO: 50.6 % — SIGNIFICANT CHANGE UP (ref 43–77)
PHOSPHATE SERPL-MCNC: 2.1 MG/DL — LOW (ref 2.5–4.5)
PLATELET # BLD AUTO: 358 K/UL — SIGNIFICANT CHANGE UP (ref 150–400)
POTASSIUM SERPL-MCNC: 4 MMOL/L — SIGNIFICANT CHANGE UP (ref 3.5–5.3)
POTASSIUM SERPL-SCNC: 4 MMOL/L — SIGNIFICANT CHANGE UP (ref 3.5–5.3)
PTH-INTACT FLD-MCNC: 152 PG/ML — HIGH (ref 15–65)
RBC # BLD: 3.85 M/UL — SIGNIFICANT CHANGE UP (ref 3.8–5.2)
RBC # BLD: 3.88 M/UL — SIGNIFICANT CHANGE UP (ref 3.8–5.2)
RBC # FLD: 16.8 % — HIGH (ref 10.3–14.5)
RETICS #: 30.8 K/UL — SIGNIFICANT CHANGE UP (ref 25–125)
RETICS/RBC NFR: 0.8 % — SIGNIFICANT CHANGE UP (ref 0.5–2.5)
SODIUM SERPL-SCNC: 143 MMOL/L — SIGNIFICANT CHANGE UP (ref 135–145)
TOTAL CHOLESTEROL/HDL RATIO MEASUREMENT: 3.4 RATIO — SIGNIFICANT CHANGE UP (ref 3.3–7.1)
TRANSFERRIN SERPL-MCNC: 357 MG/DL — SIGNIFICANT CHANGE UP (ref 200–360)
TRIGL SERPL-MCNC: 86 MG/DL — SIGNIFICANT CHANGE UP (ref 10–149)
WBC # BLD: 7 K/UL — SIGNIFICANT CHANGE UP (ref 3.8–10.5)
WBC # FLD AUTO: 7 K/UL — SIGNIFICANT CHANGE UP (ref 3.8–10.5)

## 2018-07-25 PROCEDURE — 88300 SURGICAL PATH GROSS: CPT | Mod: 26

## 2018-07-25 PROCEDURE — 52353 CYSTOURETERO W/LITHOTRIPSY: CPT | Mod: RT

## 2018-07-25 PROCEDURE — 99233 SBSQ HOSP IP/OBS HIGH 50: CPT | Mod: GC

## 2018-07-25 PROCEDURE — 99255 IP/OBS CONSLTJ NEW/EST HI 80: CPT | Mod: 25

## 2018-07-25 PROCEDURE — 74420 UROGRAPHY RTRGR +-KUB: CPT | Mod: 26

## 2018-07-25 RX ORDER — ERGOCALCIFEROL 1.25 MG/1
50000 CAPSULE ORAL
Qty: 0 | Refills: 0 | Status: DISCONTINUED | OUTPATIENT
Start: 2018-07-26 | End: 2018-07-26

## 2018-07-25 RX ORDER — POTASSIUM PHOSPHATE, MONOBASIC POTASSIUM PHOSPHATE, DIBASIC 236; 224 MG/ML; MG/ML
15 INJECTION, SOLUTION INTRAVENOUS ONCE
Qty: 0 | Refills: 0 | Status: COMPLETED | OUTPATIENT
Start: 2018-07-25 | End: 2018-07-25

## 2018-07-25 RX ORDER — TAMSULOSIN HYDROCHLORIDE 0.4 MG/1
0.4 CAPSULE ORAL AT BEDTIME
Qty: 0 | Refills: 0 | Status: DISCONTINUED | OUTPATIENT
Start: 2018-07-25 | End: 2018-07-26

## 2018-07-25 RX ORDER — FENTANYL CITRATE 50 UG/ML
25 INJECTION INTRAVENOUS
Qty: 0 | Refills: 0 | Status: DISCONTINUED | OUTPATIENT
Start: 2018-07-25 | End: 2018-07-25

## 2018-07-25 RX ORDER — ACETAMINOPHEN 500 MG
1000 TABLET ORAL ONCE
Qty: 0 | Refills: 0 | Status: COMPLETED | OUTPATIENT
Start: 2018-07-25 | End: 2018-07-25

## 2018-07-25 RX ORDER — ONDANSETRON 8 MG/1
4 TABLET, FILM COATED ORAL ONCE
Qty: 0 | Refills: 0 | Status: DISCONTINUED | OUTPATIENT
Start: 2018-07-25 | End: 2018-07-25

## 2018-07-25 RX ORDER — SODIUM CHLORIDE 9 MG/ML
1000 INJECTION, SOLUTION INTRAVENOUS
Qty: 0 | Refills: 0 | Status: DISCONTINUED | OUTPATIENT
Start: 2018-07-25 | End: 2018-07-25

## 2018-07-25 RX ORDER — OXYCODONE AND ACETAMINOPHEN 5; 325 MG/1; MG/1
1 TABLET ORAL EVERY 6 HOURS
Qty: 0 | Refills: 0 | Status: DISCONTINUED | OUTPATIENT
Start: 2018-07-25 | End: 2018-07-26

## 2018-07-25 RX ADMIN — Medication 15 MILLIGRAM(S): at 20:47

## 2018-07-25 RX ADMIN — POTASSIUM PHOSPHATE, MONOBASIC POTASSIUM PHOSPHATE, DIBASIC 62.5 MILLIMOLE(S): 236; 224 INJECTION, SOLUTION INTRAVENOUS at 23:01

## 2018-07-25 RX ADMIN — FAMOTIDINE 20 MILLIGRAM(S): 10 INJECTION INTRAVENOUS at 07:29

## 2018-07-25 RX ADMIN — TAMSULOSIN HYDROCHLORIDE 0.4 MILLIGRAM(S): 0.4 CAPSULE ORAL at 23:01

## 2018-07-25 RX ADMIN — Medication 15 MILLIGRAM(S): at 20:53

## 2018-07-25 RX ADMIN — Medication 1000 MILLIGRAM(S): at 20:43

## 2018-07-25 RX ADMIN — Medication 400 MILLIGRAM(S): at 20:33

## 2018-07-25 NOTE — PROGRESS NOTE ADULT - ASSESSMENT
48yo  female, PMHx uterine polyps (removed ~2yrs ago), anemia, hyperparathyroidism, ?neck clot (on Coumadin 5mg 15 yrs ago), who presented to ED c/o worsening right flank pain (9/10) and vomiting.  Patient admitted for symptomatic anemia and nephrolithiasis. 46yo  female, PMHx uterine polyps (removed ~2yrs ago), anemia, hyperparathyroidism, ?neck clot (on Coumadin 5mg 15 yrs ago), who presented to ED c/o worsening right flank pain (9/10) and vomiting.  Patient admitted for symptomatic anemia and nephrolithiasis.  Spoke with the patient for coumadin, she took it 10 years back for 3 months for clots in her neck vein.

## 2018-07-25 NOTE — PROGRESS NOTE ADULT - ATTENDING COMMENTS
Patient seen and examined earlier today around 11 AM; Agree with PGY1 A/P above with editing as needed and stated below. Discussed with Dr. murphy and PGY2 Dr. Price.     Patirnt doing better; Right flank pain improved; Patient scheduled for Cysto with stone extraction today. Kept NPO; H/H not appropriately rise after one unit.     Vital Signs Last 24 Hrs  T(C): 36.9 (25 Jul 2018 15:51), Max: 36.9 (25 Jul 2018 12:02)  T(F): 98.4 (25 Jul 2018 15:51), Max: 98.5 (25 Jul 2018 12:02)  HR: 72 (25 Jul 2018 15:51) (67 - 78)  BP: 117/56 (25 Jul 2018 15:51) (100/49 - 117/56)  RR: 16 (25 Jul 2018 15:51) (16 - 18)  SpO2: 100% (25 Jul 2018 15:51) (96% - 100%)    P/E; Patient seen and examined earlier today around 11 AM; Agree with PGY1 A/P above with editing as needed and stated below. Discussed with Dr. murphy and PGY2 Dr. Price.     Patirnt doing better; Right flank pain improved; Patient scheduled for Cysto with stone extraction today. Kept NPO; H/H not appropriately rise after one unit.     Vital Signs Last 24 Hrs  T(C): 36.9 (25 Jul 2018 15:51), Max: 36.9 (25 Jul 2018 12:02)  T(F): 98.4 (25 Jul 2018 15:51), Max: 98.5 (25 Jul 2018 12:02)  HR: 72 (25 Jul 2018 15:51) (67 - 78)  BP: 117/56 (25 Jul 2018 15:51) (100/49 - 117/56)  RR: 16 (25 Jul 2018 15:51) (16 - 18)  SpO2: 100% (25 Jul 2018 15:51) (96% - 100%)    P/E; As above; Clinically NAD    Labs:                        7.2    7.0   )-----------( 358      ( 25 Jul 2018 06:48 )             25.2   07-25    143  |  113<H>  |  8   ----------------------------<  90  4.0   |  23  |  0.52    Ca    10.0      25 Jul 2018 06:48  Phos  2.1     07-25  Mg     2.0     07-25    TPro  7.7  /  Alb  3.7  /  TBili  0.5  /  DBili  x   /  AST  19  /  ALT  25  /  AlkPhos  118  07-24    Hemoglobin A1C, Whole Blood (07.25.18 @ 09:19)    Hemoglobin A1C, Whole Blood: 5.4: Method: Immunoassay  Thyroid Stimulating Hormone, Serum (07.24.18 @ 15:18)    Thyroid Stimulating Hormone, Serum: 0.60 uU/mL  Lipid Profile (07.25.18 @ 06:48)    Total Cholesterol/HDL Ratio Measurement: 3.4 RATIO    Cholesterol, Serum: 137 mg/dL    Triglycerides, Serum: 86 mg/dL    HDL Cholesterol, Serum: 40 mg/dL    Direct LDL: 80: Patient seen and examined earlier today around 11 AM; Agree with PGY1 A/P above with editing as needed and stated below. Discussed with Dr. thomas and PGY2 Dr. Price.     Patirnt doing better; Right flank pain improved; Patient scheduled for Cysto with stone extraction today. Kept NPO; H/H not appropriately rise after one unit.     Vital Signs Last 24 Hrs  T(C): 36.9 (25 Jul 2018 15:51), Max: 36.9 (25 Jul 2018 12:02)  T(F): 98.4 (25 Jul 2018 15:51), Max: 98.5 (25 Jul 2018 12:02)  HR: 72 (25 Jul 2018 15:51) (67 - 78)  BP: 117/56 (25 Jul 2018 15:51) (100/49 - 117/56)  RR: 16 (25 Jul 2018 15:51) (16 - 18)  SpO2: 100% (25 Jul 2018 15:51) (96% - 100%)    P/E; As above; Clinically NAD    Labs:                        7.2    7.0   )-----------( 358      ( 25 Jul 2018 06:48 )             25.2   07-25    143  |  113<H>  |  8   ----------------------------<  90  4.0   |  23  |  0.52    Ca    10.0      25 Jul 2018 06:48  Phos  2.1     07-25  Mg     2.0     07-25    TPro  7.7  /  Alb  3.7  /  TBili  0.5  /  DBili  x   /  AST  19  /  ALT  25  /  AlkPhos  118  07-24    Hemoglobin A1C, Whole Blood (07.25.18 @ 09:19)    Hemoglobin A1C, Whole Blood: 5.4: Method: Immunoassay  Thyroid Stimulating Hormone, Serum (07.24.18 @ 15:18)    Thyroid Stimulating Hormone, Serum: 0.60 uU/mL  Lipid Profile (07.25.18 @ 06:48)    Total Cholesterol/HDL Ratio Measurement: 3.4 RATIO    Cholesterol, Serum: 137 mg/dL    Triglycerides, Serum: 86 mg/dL    HDL Cholesterol, Serum: 40 mg/dL    Direct LDL: 80:    D/D:  /D;  Symptomatic Anemia due to likely Iron deficiency due to Menorrhagia due to excessive Menstrual loss from possible Fibroid  Right Ureteral obstructing stone with Moderate Hydronephrosis  Hypercalcemia and Renal Stones likely  due to Hyperparathyroidism stable  Leucocytosis reactive due to Anemia resolved  Overweight    Plan:  Iron studies c/w Iron deficiency anemia  Will place on Venofer from tomorrow after one more unit PRBC today as patient scheduled for OR.  Urology eval appreciated for Cysto and stone removal +/- stent  Labs repeat in AM    Patient needs to follow up with Gyn as outpatient for possible Myomectomy which will likely cure her Iron deficiency   d/w  Joi to arrange outpatient appointment at [facility]. She will also need Parathyroidectomy.   Exercise and lifestyle modifications    Discussed with patient findings and plan of care  Discussed with RN   Discussed with PGY1 Dr. Thomas and PGY2 Dr. Price  Discussed with Urology Dr. Wei

## 2018-07-25 NOTE — PROGRESS NOTE ADULT - PROBLEM SELECTOR PLAN 2
CT A/P: 0.4 cm distal right ureter stone causing moderate right   hydronephrosis. Small bilateral non-obstructing renal calculi measuring up to 0.3 cm in the right lower pole and 0.2 cm in the left lower pole. Several small renal cysts.  c/w Flomax + IV hydration + pain management  UA negative, isolated mild leukocytosis likely reactive; afebrile  WBC: 9900, afebrile, infection less likely  Urology consulted: cystoscopy planned for today, with 1 PRBC on hold for OR CT A/P: 0.4 cm distal right ureter stone causing moderate right   hydronephrosis. Small bilateral non-obstructing renal calculi measuring up to 0.3 cm in the right lower pole and 0.2 cm in the left lower pole. Several small renal cysts.  c/w Flomax + IV hydration + pain management  UA negative, isolated mild leukocytosis likely reactive; afebrile  WBC: 9900, afebrile, infection less likely but will follow up with urine culture   Urology consulted: cystoscopy planned for today,

## 2018-07-25 NOTE — PROGRESS NOTE ADULT - PROBLEM SELECTOR PLAN 1
-p/w Hb: 6.8  -s/p 1 PRBC transfusion, repeat HB of 7.4.  - transfuse another unit of PRBC   - cystoscopy planned today  - myomectomy for uterine fibroid recommended, heavy menstrual blood flow most likely cause of her anemia, Serum ferritin: 3, Fe:13, unsaturated iron binding capacity of 455, total binding capacity: 468, percentage saturation: 3  -iv Venofar -p/w Hb: 6.8  -s/p 1 PRBC transfusion, repeat HB of 7.4.  - transfuse 2nd PRBC and follow up with HB   - cystoscopy planned today  - myomectomy for uterine fibroid recommended, heavy menstrual blood flow most likely cause of her anemia, Serum ferritin: 3, Fe:13, unsaturated iron binding capacity of 455, total binding capacity: 468, percentage saturation: 3  -iv Venofar

## 2018-07-25 NOTE — PROGRESS NOTE ADULT - SUBJECTIVE AND OBJECTIVE BOX
PGY 1 Note discussed with supervising resident and primary attending    Patient is a 47y old  Female who presents with a chief complaint of right flank pain (2018 10:29)      INTERVAL HPI/OVERNIGHT EVENTS:   Patient seen at the bedside, doing fine, denies any new complain. does not have any flank pain now.     MEDICATIONS  (STANDING):  famotidine    Tablet 20 milliGRAM(s) Oral two times a day  iron sucrose IVPB 200 milliGRAM(s) IV Intermittent <User Schedule>  sodium chloride 0.9%. 1000 milliLiter(s) (75 mL/Hr) IV Continuous <Continuous>  tamsulosin 0.4 milliGRAM(s) Oral at bedtime    MEDICATIONS  (PRN):  ketorolac   Injectable 15 milliGRAM(s) IV Push every 6 hours PRN Moderate Pain (4 - 6)  morphine  - Injectable 1 milliGRAM(s) IV Push every 4 hours PRN Severe Pain (7 - 10)      __________________________________________________  REVIEW OF SYSTEMS:    CONSTITUTIONAL: No fever,   EYES: no acute visual disturbances  NECK: No pain or stiffness  RESPIRATORY: No cough; No shortness of breath  CARDIOVASCULAR: No chest pain, no palpitations  GASTROINTESTINAL: No pain. No nausea or vomiting; No diarrhea   NEUROLOGICAL: No headache or numbness, no tremors  MUSCULOSKELETAL: No joint pain, no muscle pain  GENITOURINARY: no dysuria, no frequency, no hesitancy  PSYCHIATRY: no depression , no anxiety  ALL OTHER  ROS negative        Vital Signs Last 24 Hrs  T(C): 36.7 (2018 05:04), Max: 36.9 (2018 07:36)  T(F): 98 (2018 05:04), Max: 98.4 (2018 07:36)  HR: 67 (2018 05:04) (59 - 78)  BP: 100/49 (2018 05:04) (94/45 - 114/56)  BP(mean): --  RR: 18 (2018 05:04) (17 - 18)  SpO2: 96% (2018 05:04) (96% - 100%)    ________________________________________________  PHYSICAL EXAM:  GENERAL: NAD  HEENT: Normocephalic;  conjunctivae and sclerae clear; moist mucous membranes;   NECK : supple  CHEST/LUNG: Clear to auscultation bilaterally with good air entry   HEART: S1 S2  regular; no murmurs, gallops or rubs  ABDOMEN: Soft, Nontender, Nondistended; Bowel sounds present  EXTREMITIES: no cyanosis; no edema; no calf tenderness  SKIN: warm and dry; no rash  NERVOUS SYSTEM:  Awake and alert; Oriented  to place, person and time ; no new deficits    _________________________________________________  LABS:                        7.4    9.9   )-----------( 396      ( 2018 17:44 )             25.9     07-24    139  |  108  |  14  ----------------------------<  120<H>  4.0   |  24  |  0.77    Ca    11.1<H>      2018 05:08    TPro  7.7  /  Alb  3.7  /  TBili  0.5  /  DBili  x   /  AST  19  /  ALT  25  /  AlkPhos  118  07-24    PT/INR - ( 2018 09:19 )   PT: 11.3 sec;   INR: 1.04 ratio         PTT - ( 2018 09:19 )  PTT:23.7 sec  Urinalysis Basic - ( 2018 05:08 )    Color: Yellow / Appearance: Clear / S.015 / pH: x  Gluc: x / Ketone: Negative  / Bili: Negative / Urobili: Negative   Blood: x / Protein: Negative / Nitrite: Negative   Leuk Esterase: Negative / RBC: 2-5 /HPF / WBC 0-2 /HPF   Sq Epi: x / Non Sq Epi: x / Bacteria: x      CAPILLARY BLOOD GLUCOSE            RADIOLOGY & ADDITIONAL TESTS:    Imaging Personally Reviewed:  YES      < from: CT Abdomen and Pelvis w/ Oral Cont and w/ IV Cont (18 @ 07:29) >    INTERPRETATION:  CLINICAL INFORMATION: Right lower quadrant pain    COMPARISON: None    PROCEDURE:   CT of the Abdomen and Pelvis was performed with intravenous contrast.   Intravenous contrast: 90 ml Omnipaque 350. 10 ml discarded.  Oral contrast: positive contrast was administered.  Sagittal and coronal reformats were performed.    FINDINGS:    LOWER CHEST: Within normal limits.    LIVER: Withinnormal limits.  BILE DUCTS: Normal caliber.  GALLBLADDER: Within normal limits.  SPLEEN: Within normal limits.  PANCREAS: Within normal limits.  ADRENALS: Within normal limits.  KIDNEYS/URETERS: 0.4 cm distal right ureter stone causing moderate right  hydronephrosis. Additional small bilateral nonobstructing renal calculi   measuring up to 0.3 cm in the right lower pole and 0.2 cm in the left   lower pole. Several small renal cysts.    BLADDER: Within normal limits.  REPRODUCTIVE ORGANS: Fibroiduterus. No adnexal mass.    BOWEL: No bowel obstruction. Appendix normal.  PERITONEUM: No ascites.  VESSELS:  Within normal limits.  RETROPERITONEUM: No lymphadenopathy.    ABDOMINAL WALL: Within normal limits.  BONES: Within normal limits.    IMPRESSION: 0.4 cm distal right ureter stone causing moderate right   hydronephrosis.     < end of copied text >    Consultant(s) Notes Reviewed:   YES/ No    Care Discussed with Consultants :     Plan of care was discussed with patient and /or primary care giver; all questions and concerns were addressed and care was aligned with patient's wishes.

## 2018-07-26 VITALS
SYSTOLIC BLOOD PRESSURE: 96 MMHG | HEART RATE: 71 BPM | DIASTOLIC BLOOD PRESSURE: 63 MMHG | TEMPERATURE: 98 F | RESPIRATION RATE: 16 BRPM | OXYGEN SATURATION: 100 %

## 2018-07-26 LAB
ANION GAP SERPL CALC-SCNC: 6 MMOL/L — SIGNIFICANT CHANGE UP (ref 5–17)
BUN SERPL-MCNC: 13 MG/DL — SIGNIFICANT CHANGE UP (ref 7–18)
CALCIUM SERPL-MCNC: 10.7 MG/DL — HIGH (ref 8.4–10.5)
CHLORIDE SERPL-SCNC: 111 MMOL/L — HIGH (ref 96–108)
CO2 SERPL-SCNC: 24 MMOL/L — SIGNIFICANT CHANGE UP (ref 22–31)
CREAT SERPL-MCNC: 0.66 MG/DL — SIGNIFICANT CHANGE UP (ref 0.5–1.3)
CULTURE RESULTS: SIGNIFICANT CHANGE UP
GLUCOSE SERPL-MCNC: 94 MG/DL — SIGNIFICANT CHANGE UP (ref 70–99)
HCT VFR BLD CALC: 30.2 % — LOW (ref 34.5–45)
HGB BLD-MCNC: 9 G/DL — LOW (ref 11.5–15.5)
MAGNESIUM SERPL-MCNC: 2 MG/DL — SIGNIFICANT CHANGE UP (ref 1.6–2.6)
MCHC RBC-ENTMCNC: 20 PG — LOW (ref 27–34)
MCHC RBC-ENTMCNC: 29.7 GM/DL — LOW (ref 32–36)
MCV RBC AUTO: 67.4 FL — LOW (ref 80–100)
PHOSPHATE SERPL-MCNC: 1.9 MG/DL — LOW (ref 2.5–4.5)
PLATELET # BLD AUTO: 364 K/UL — SIGNIFICANT CHANGE UP (ref 150–400)
POTASSIUM SERPL-MCNC: 4.1 MMOL/L — SIGNIFICANT CHANGE UP (ref 3.5–5.3)
POTASSIUM SERPL-SCNC: 4.1 MMOL/L — SIGNIFICANT CHANGE UP (ref 3.5–5.3)
RBC # BLD: 4.48 M/UL — SIGNIFICANT CHANGE UP (ref 3.8–5.2)
RBC # FLD: 18.3 % — HIGH (ref 10.3–14.5)
SODIUM SERPL-SCNC: 141 MMOL/L — SIGNIFICANT CHANGE UP (ref 135–145)
SPECIMEN SOURCE: SIGNIFICANT CHANGE UP
VIT B12 SERPL-MCNC: 1482 PG/ML — HIGH (ref 232–1245)
WBC # BLD: 6.8 K/UL — SIGNIFICANT CHANGE UP (ref 3.8–10.5)
WBC # FLD AUTO: 6.8 K/UL — SIGNIFICANT CHANGE UP (ref 3.8–10.5)

## 2018-07-26 PROCEDURE — 82728 ASSAY OF FERRITIN: CPT

## 2018-07-26 PROCEDURE — 83970 ASSAY OF PARATHORMONE: CPT

## 2018-07-26 PROCEDURE — 84466 ASSAY OF TRANSFERRIN: CPT

## 2018-07-26 PROCEDURE — C1758: CPT

## 2018-07-26 PROCEDURE — 82365 CALCULUS SPECTROSCOPY: CPT

## 2018-07-26 PROCEDURE — 83036 HEMOGLOBIN GLYCOSYLATED A1C: CPT

## 2018-07-26 PROCEDURE — 82306 VITAMIN D 25 HYDROXY: CPT

## 2018-07-26 PROCEDURE — 86850 RBC ANTIBODY SCREEN: CPT

## 2018-07-26 PROCEDURE — 87389 HIV-1 AG W/HIV-1&-2 AB AG IA: CPT

## 2018-07-26 PROCEDURE — 80053 COMPREHEN METABOLIC PANEL: CPT

## 2018-07-26 PROCEDURE — 84443 ASSAY THYROID STIM HORMONE: CPT

## 2018-07-26 PROCEDURE — 84702 CHORIONIC GONADOTROPIN TEST: CPT

## 2018-07-26 PROCEDURE — 71045 X-RAY EXAM CHEST 1 VIEW: CPT

## 2018-07-26 PROCEDURE — 83550 IRON BINDING TEST: CPT

## 2018-07-26 PROCEDURE — 83010 ASSAY OF HAPTOGLOBIN QUANT: CPT

## 2018-07-26 PROCEDURE — 80061 LIPID PANEL: CPT

## 2018-07-26 PROCEDURE — C1769: CPT

## 2018-07-26 PROCEDURE — 74177 CT ABD & PELVIS W/CONTRAST: CPT

## 2018-07-26 PROCEDURE — 83735 ASSAY OF MAGNESIUM: CPT

## 2018-07-26 PROCEDURE — 86901 BLOOD TYPING SEROLOGIC RH(D): CPT

## 2018-07-26 PROCEDURE — 82310 ASSAY OF CALCIUM: CPT

## 2018-07-26 PROCEDURE — 85610 PROTHROMBIN TIME: CPT

## 2018-07-26 PROCEDURE — 86900 BLOOD TYPING SEROLOGIC ABO: CPT

## 2018-07-26 PROCEDURE — 85730 THROMBOPLASTIN TIME PARTIAL: CPT

## 2018-07-26 PROCEDURE — 83615 LACTATE (LD) (LDH) ENZYME: CPT

## 2018-07-26 PROCEDURE — 82607 VITAMIN B-12: CPT

## 2018-07-26 PROCEDURE — P9040: CPT

## 2018-07-26 PROCEDURE — 81001 URINALYSIS AUTO W/SCOPE: CPT

## 2018-07-26 PROCEDURE — 99239 HOSP IP/OBS DSCHRG MGMT >30: CPT

## 2018-07-26 PROCEDURE — 80048 BASIC METABOLIC PNL TOTAL CA: CPT

## 2018-07-26 PROCEDURE — C1889: CPT

## 2018-07-26 PROCEDURE — 36430 TRANSFUSION BLD/BLD COMPNT: CPT

## 2018-07-26 PROCEDURE — 85027 COMPLETE CBC AUTOMATED: CPT

## 2018-07-26 PROCEDURE — 86923 COMPATIBILITY TEST ELECTRIC: CPT

## 2018-07-26 PROCEDURE — 87086 URINE CULTURE/COLONY COUNT: CPT

## 2018-07-26 PROCEDURE — 76000 FLUOROSCOPY <1 HR PHYS/QHP: CPT

## 2018-07-26 PROCEDURE — 85045 AUTOMATED RETICULOCYTE COUNT: CPT

## 2018-07-26 PROCEDURE — 99285 EMERGENCY DEPT VISIT HI MDM: CPT | Mod: 25

## 2018-07-26 PROCEDURE — 84100 ASSAY OF PHOSPHORUS: CPT

## 2018-07-26 PROCEDURE — 88300 SURGICAL PATH GROSS: CPT

## 2018-07-26 RX ORDER — IRON SUCROSE 20 MG/ML
200 INJECTION, SOLUTION INTRAVENOUS EVERY 24 HOURS
Qty: 0 | Refills: 0 | Status: DISCONTINUED | OUTPATIENT
Start: 2018-07-26 | End: 2018-07-26

## 2018-07-26 RX ORDER — FERROUS SULFATE 325(65) MG
1 TABLET ORAL
Qty: 30 | Refills: 0 | OUTPATIENT
Start: 2018-07-26 | End: 2018-08-24

## 2018-07-26 RX ORDER — ERGOCALCIFEROL 1.25 MG/1
1 CAPSULE ORAL
Qty: 4 | Refills: 0 | OUTPATIENT
Start: 2018-07-26 | End: 2018-08-24

## 2018-07-26 RX ORDER — TAMSULOSIN HYDROCHLORIDE 0.4 MG/1
1 CAPSULE ORAL
Qty: 0 | Refills: 0 | COMMUNITY
Start: 2018-07-26

## 2018-07-26 RX ORDER — TAMSULOSIN HYDROCHLORIDE 0.4 MG/1
1 CAPSULE ORAL
Qty: 10 | Refills: 0 | OUTPATIENT
Start: 2018-07-26 | End: 2018-08-04

## 2018-07-26 RX ORDER — SODIUM,POTASSIUM PHOSPHATES 278-250MG
1 POWDER IN PACKET (EA) ORAL
Qty: 0 | Refills: 0 | Status: DISCONTINUED | OUTPATIENT
Start: 2018-07-26 | End: 2018-07-26

## 2018-07-26 RX ORDER — IBUPROFEN 200 MG
1 TABLET ORAL
Qty: 0 | Refills: 0 | COMMUNITY

## 2018-07-26 RX ADMIN — FAMOTIDINE 20 MILLIGRAM(S): 10 INJECTION INTRAVENOUS at 06:03

## 2018-07-26 RX ADMIN — Medication 15 MILLIGRAM(S): at 04:00

## 2018-07-26 RX ADMIN — Medication 15 MILLIGRAM(S): at 03:24

## 2018-07-26 RX ADMIN — ERGOCALCIFEROL 50000 UNIT(S): 1.25 CAPSULE ORAL at 13:20

## 2018-07-26 RX ADMIN — IRON SUCROSE 110 MILLIGRAM(S): 20 INJECTION, SOLUTION INTRAVENOUS at 13:24

## 2018-07-26 NOTE — DISCHARGE NOTE ADULT - PATIENT PORTAL LINK FT
You can access the [facility], offered by [facility], by registering with the following website: [email or web address]

## 2018-07-26 NOTE — PROGRESS NOTE ADULT - ASSESSMENT
46 y/o female s/p cystoscopy, laser lithotripsy, right stent placement 7/25    -f/u AM labs   -C/w Flomax   -F/u ucx   -Pt to follow up with Dr Manuel in office upon discharge  -Care as per medical team 46 y/o female s/p cystoscopy, laser lithotripsy 7/25    -f/u AM labs   -C/w Flomax   -F/u ucx   -Pt to follow up with Dr Manuel in office upon discharge  -Care as per medical team   -Discharge as per medical team

## 2018-07-26 NOTE — DISCHARGE NOTE ADULT - HOSPITAL COURSE
46yo  female, PMHx uterine polyps (removed ~2yrs ago), anemia, hyperparathyroidism, who presented to ED c/o worsening right flank pain (9/10) and vomiting. Patient refers symptoms started 2 weeks ago, and worsened three days ago. It was associated with chills, diaphoresis, nausea/vomiting, and previous history of nephrolithiasis.    Patient was admitted for symptomatic anemia and nephrolithiasis.    For Symptomatic anemia ( with Hx uterine polyps, heavy menstrual periods  H/H: 6.8/24.4; MCV: 63.4, MCH: 17.7 and RDW: 15.5, Serum ferritin: 3, Fe:13, unsaturated iron binding capacity of 455, total binding capacity: 468, percentage saturation: 3 and CT A/P: fibroid uterus) pt received 2 units of PRBC, repeat HB was----. Her anemia is most likley because of blood loss because of heavy menstrual flow.  Joi consulted to arrange outpatient appointment at [facility] for myomectomy.    For Obstructive uropathy, CT A/P: 0.4 cm distal right ureter stone causing moderate right   hydronephrosis. Small bilateral non-obstructing renal calculi measuring up to 0.3 cm in the right lower pole and 0.2 cm in the left lower pole. Several small renal cysts, we c/w Flomax + IV hydration + pain management, UA negative, isolated mild leukocytosis likely reactive; afebrile, urology consulted for cystoscopy, Right ureteroscopy, laser lithotripsy and right stone extraction done. Follow up biopsy--------    For Hyperparathyroidism p/w PTH: 152, S. Ca: 10.7 (Patient refers being diagnosed couple of years back, recommended to have surgery but got uninsured and was unable to follow up.) She is recommended to have Parathyroidectomy.    Patient medically stable to discharge. 46yo  female, PMHx uterine polyps (removed ~2yrs ago), anemia, hyperparathyroidism, who presented to ED c/o worsening right flank pain (9/10) and vomiting. Patient refers symptoms started 2 weeks ago, and worsened three days ago. It was associated with chills, diaphoresis, nausea/vomiting, and previous history of nephrolithiasis.    Patient was admitted for symptomatic anemia and nephrolithiasis.    For Symptomatic anemia ( with Hx uterine polyps, heavy menstrual periods  H/H: 6.8/24.4; MCV: 63.4, MCH: 17.7 and RDW: 15.5, Serum ferritin: 3, Fe:13, unsaturated iron binding capacity of 455, total binding capacity: 468, percentage saturation: 3 and CT A/P: fibroid uterus) pt received 2 units of PRBC, repeat HB was----. Her anemia is most likley because of blood loss because of heavy menstrual flow.  Joi consulted to arrange outpatient appointment at [facility] for myomectomy.    For Obstructive uropathy, CT A/P: 0.4 cm distal right ureter stone causing moderate right   hydronephrosis. Small bilateral non-obstructing renal calculi measuring up to 0.3 cm in the right lower pole and 0.2 cm in the left lower pole. Several small renal cysts, we c/w Flomax + IV hydration + pain management, UA negative, isolated mild leukocytosis likely reactive; afebrile, urology consulted for cystoscopy, Right ureteroscopy, laser lithotripsy and right stone extraction done. Follow up biopsy result on outpatient basis.     For Hyperparathyroidism p/w PTH: 152, S. Ca: 10.7 (Patient refers being diagnosed couple of years back, recommended to have surgery but got uninsured and was unable to follow up.) She is recommended to have Parathyroidectomy.    Patient medically stable to discharge.

## 2018-07-26 NOTE — PROGRESS NOTE ADULT - SUBJECTIVE AND OBJECTIVE BOX
INTERVAL HPI/OVERNIGHT EVENTS:    Pt s/p cystoscopy, laser lithotripsy, rt stent placement 7/25, seen and examined at bedside. Pt offers no acute complaints at this time. Denies Abd pain, no nausea/ vomiting. Voiding well, admits to dysuria, no hematuria. No fever, chills, SOB or CP. On reg diet, tolerating well.     Vital Signs Last 24 Hrs  T(C): 36.7 (26 Jul 2018 05:17), Max: 36.9 (25 Jul 2018 12:02)  T(F): 98.1 (26 Jul 2018 05:17), Max: 98.5 (25 Jul 2018 12:02)  HR: 62 (26 Jul 2018 05:17) (62 - 90)  BP: 109/62 (26 Jul 2018 05:17) (104/50 - 122/79)  BP(mean): --  RR: 18 (26 Jul 2018 05:17) (13 - 18)  SpO2: 95% (26 Jul 2018 05:17) (95% - 100%)  I&O's Detail    25 Jul 2018 07:01  -  26 Jul 2018 07:00  --------------------------------------------------------  IN:    Lactated Ringers IV Bolus: 900 mL  Total IN: 900 mL    OUT:  Total OUT: 0 mL    Total NET: 900 mL      famotidine    Tablet 20 milliGRAM(s) Oral two times a day      Physical Exam  General: AAOx3, No acute distress  Skin: No jaundice, no icterus  Abdomen: soft,  nondistended, nontender, no rebound tenderness, no guarding, no palpable masses  : Normal external genitalia  Extremities: non edematous, no calf pain bilaterally INTERVAL HPI/OVERNIGHT EVENTS:    Pt s/p cystoscopy, laser lithotripsy 7/25, seen and examined at bedside. Pt offers no acute complaints at this time. Denies Abd pain, no nausea/ vomiting. Voiding well, admits to dysuria, no hematuria. No fever, chills, SOB or CP. On reg diet, tolerating well.     Vital Signs Last 24 Hrs  T(C): 36.7 (26 Jul 2018 05:17), Max: 36.9 (25 Jul 2018 12:02)  T(F): 98.1 (26 Jul 2018 05:17), Max: 98.5 (25 Jul 2018 12:02)  HR: 62 (26 Jul 2018 05:17) (62 - 90)  BP: 109/62 (26 Jul 2018 05:17) (104/50 - 122/79)  BP(mean): --  RR: 18 (26 Jul 2018 05:17) (13 - 18)  SpO2: 95% (26 Jul 2018 05:17) (95% - 100%)  I&O's Detail    25 Jul 2018 07:01  -  26 Jul 2018 07:00  --------------------------------------------------------  IN:    Lactated Ringers IV Bolus: 900 mL  Total IN: 900 mL    OUT:  Total OUT: 0 mL    Total NET: 900 mL      famotidine    Tablet 20 milliGRAM(s) Oral two times a day      Physical Exam  General: AAOx3, No acute distress  Skin: No jaundice, no icterus  Abdomen: soft,  nondistended, nontender, no rebound tenderness, no guarding, no palpable masses  : Normal external genitalia  Extremities: non edematous, no calf pain bilaterally

## 2018-07-26 NOTE — DISCHARGE NOTE ADULT - CARE PROVIDER_API CALL
Sae Manuel (MD), Urology  [address] [place] [address]  [address] [place] [address]  Phone: [phone]  Fax: [phone]

## 2018-07-26 NOTE — DISCHARGE NOTE ADULT - PLAN OF CARE
no stone Your CT A/P: 0.4 cm distal right ureter stone causing moderate right hydronephrosis. Small bilateral non-obstructing renal calculi measuring up to 0.3 cm in the right lower pole and 0.2 cm in the left lower pole. Several small renal cysts, we continued with  Flomax + IV hydration + pain management, UA negative, infection was ruled out, urology consulted for cystoscopy, Right ureteroscopy, laser lithotripsy and right stone extraction done. Follow up biopsy--------  You are recommended to Your CT A/P: 0.4 cm distal right ureter stone causing moderate right hydronephrosis. Small bilateral non-obstructing renal calculi measuring up to 0.3 cm in the right lower pole and 0.2 cm in the left lower pole. Several small renal cysts, we continued with  Flomax + IV hydration + pain management, UA negative, infection was ruled out, urology consulted for cystoscopy, Right ureteroscopy, laser lithotripsy and right stone extraction done. Follow up biopsy--------  You are recommended to take medication as advised, drink Your CT A/P: 0.4 cm distal right ureter stone causing moderate right hydronephrosis. Small bilateral non-obstructing renal calculi measuring up to 0.3 cm in the right lower pole and 0.2 cm in the left lower pole. Several small renal cysts, we continued with  Flomax + IV hydration + pain management, UA negative, infection was ruled out, urology consulted for cystoscopy, Right ureteroscopy, laser lithotripsy and right stone extraction done. Follow up with biopsy results as outpatient basis with urologist.   You are recommended to take medication as advised, drink a lot of fluids, avoid dehydration.   Follow up with PCP in 1 week. You are recommended to have Parathyroidectomy. For Symptomatic anemia ( with Hx uterine polyps, heavy menstrual periods  H/H: 6.8/24.4; MCV: 63.4, MCH: 17.7 and RDW: 15.5, Serum ferritin: 3, Fe:13, unsaturated iron binding capacity of 455, total binding capacity: 468, percentage saturation: 3 and CT A/P: fibroid uterus) pt received 2 units of PRBC, repeat HB was----. Her anemia is most likley because of blood loss because of heavy menstrual flow.  Joi consulted to arrange outpatient appointment at [facility] for myomectomy.  You are recommended to take medications as advised, green leafy vegetables, follow up with OBGYN for surgery for fibroids. Follow up with PCP in 1 week. Your CT scan : showed fibroid uterus. Follow up with OBGYN for myomectomy. Fibroids is the most likely cause of heavy menstrual flow and anemia. Take medications as advised. Follow up with PCP in 1 week You presented with PTH: 152 and calcium of 11.1 and nephrolithiasis, most likely because of Hyperparathyroidism. You are recommended to take medication as advised and follow up with PCP in 1 week and you are recommended to get parathyroidectomy. Prevent recurrent stone formation You presented to hospital with Right flank pain;   Your CT Scan of Abdomen found to have 0.4 cm distal right ureter stone causing moderate right hydronephrosis (swelling of the kidneys). Small bilateral non-obstructing renal calculi measuring up to 0.3 cm in the right lower pole and 0.2 cm in the left lower pole. Several small renal cysts, We treated you with Flomax (to relax smooth muscle in ureter) + Intravenous hydration with fluids + pain management, Urinalysis negative, infection was ruled out. Urology Dr. Wei consulted for cystoscopy, Right ureteroscopy, laser lithotripsy and right stone extraction done. Follow up with outpatient basis with urologist.   You are recommended to take medication as advised, drink a lot of fluids, avoid dehydration.   Follow up with PCP in 1 week. You are recommended to have Parathyroidectomy. Target Hemoglobin more than 10gm. You also had some Shortness of Breath found to habe low Hemoglobin to 6gms on admission.   For Symptomatic anemia ( with Hx uterine polyps, heavy menstrual periods  H/H: 6.8/24.4; MCV: 63.4, MCH: 17.7 and RDW: 15.5, Serum ferritin: 3 (very low), Serum Iron:13 (very low),  Total Iron binding capacity: 468 (elevated), percentage saturation: 3 (low) all consistent with Iron deficiency anemia. and CT Abdomen and Pelvis: shows fibroid uterus pt received 2 units of PRBC, repeat HB was----. Her Iron deficiency anemia is most likely because of blood loss because of heavy menstrual flow.  Joi consulted to arrange outpatient appointment at [facility] for her routine follow up with PCP as well as referral to Gyn for evaluation for Myomectomy versus Hysterectomy. You also needs surgery to remove parathyroid gland   You are recommended to take medications as advised, green leafy vegetables, follow up with OBGYN for surgery for fibroids. Follow up with PCP in 1 week. Maintain Calcium levels normal limits and prevent stones and complications You presented with PTH: 152 and calcium of 11.1 and nephrolithiasis, most likely because of Hyperparathyroidism (which you have been told in the past and recommended surgery. You are recommended to take medication as advised and follow up with PCP in 1 week and you are recommended to get parathyroidectomy with a Head and Neck Surgeon which is likely causing recurrent Kidney stones. You also had some Shortness of Breath found to have low Hemoglobin to 6gms on admission.   For Symptomatic anemia ( with Hx uterine polyps, heavy menstrual periods  H/H: 6.8/24.4; MCV: 63.4, MCH: 17.7 and RDW: 15.5, Serum ferritin: 3 (very low), Serum Iron:13 (very low),  Total Iron binding capacity: 468 (elevated), percentage saturation: 3 (low) all consistent with Iron deficiency anemia. and CT Abdomen and Pelvis: shows fibroid uterus pt received 2 units of PRBC, repeat HB was----. Her Iron deficiency anemia is most likely because of blood loss because of heavy menstrual flow.  Joi consulted to arrange outpatient appointment at [facility] for her routine follow up with PCP as well as referral to Gyn for evaluation for Myomectomy versus Hysterectomy. You also needs surgery to remove parathyroid gland   You are recommended to take medications as advised, green leafy vegetables, follow up with OBGYN for surgery for fibroids. Follow up with PCP in 1 week. Your CT scan : showed fibroid uterus. Follow up with OB/GYN for myomectomy. Fibroids is the most likely cause of heavy menstrual flow and anemia. Take medications as advised. Follow up with PCP in 1 week Target Vitamin D more than 30; Your 25 Hydroxy Vitamin D was found to be low at 19; You are advised to take Vitamin D once a week for 6 weeks; Repeat levels with PCP in 6 weeks. Once levels more than 30, reduce to 1000 units daily. If you are unable to follow up with [facility] in time continue to take 1000 units daily. You presented to hospital with Right flank pain; Your CT Scan of Abdomen found to have 0.4 cm distal right ureter stone causing moderate right hydronephrosis (swelling of the kidneys). Small bilateral non-obstructing renal calculi measuring up to 0.3 cm in the right lower pole and 0.2 cm in the left lower pole. Several small renal cysts, We treated you with Flomax (to relax smooth muscle in ureter) + Intravenous hydration with fluids + pain management, Urinalysis negative, infection was ruled out. Urology Dr. Wei consulted for cystoscopy, Right ureteroscopy, laser lithotripsy and right stone extraction done. Follow up with outpatient basis with urologist.   You are recommended to take medication as advised, drink a lot of fluids, avoid dehydration.   Follow up with PCP in 1 week. You are recommended to have Parathyroidectomy. You are scheduled on Friday 8/31 at 10:20AM at [facility] Room D1-55. Please call and try to make earlier appointment. You also had some Shortness of Breath found to have low Hemoglobin to 6gms on admission.   For Symptomatic anemia ( with Hx uterine polyps, heavy menstrual periods  H/H: 6.8/24.4; MCV: 63.4, MCH: 17.7 and RDW: 15.5, Serum ferritin: 3 (very low), Serum Iron:13 (very low),  Total Iron binding capacity: 468 (elevated), percentage saturation: 3 (low) all consistent with Iron deficiency anemia. and CT Abdomen and Pelvis: shows fibroid uterus pt received 2 units of PRBC, repeat HB was 9.0. Her Iron deficiency anemia is most likely because of blood loss because of heavy menstrual flow.  Joi consulted to arrange outpatient appointment at [facility] for her routine follow up with PCP as well as referral to Gyn for evaluation for Myomectomy versus Hysterectomy. You also needs surgery to remove parathyroid gland   You are recommended to take medications as advised, green leafy vegetables, follow up with OBGYN for surgery for fibroids. Follow up with PCP in 1 week.   You are scheduled on Friday 8/31 at 10:20AM at [facility] Room [id number]. Please call and try to make earlier appointment. Your CT scan : showed fibroid uterus. Follow up with OB/GYN for myomectomy. Fibroids is the most likely cause of heavy menstrual flow and anemia. Take medications as advised. Follow up with PCP in 1 week   You are scheduled on Friday 8/31 at 10:20AM at [facility] Room [id number]. Please call and try to make earlier appointment. You presented with PTH: 152 and calcium of 11.1 and nephrolithiasis, most likely because of Hyperparathyroidism (which you have been told in the past and recommended surgery. You are recommended to take medication as advised and follow up with PCP in 1 week and you are recommended to get parathyroidectomy with a Head and Neck Surgeon which is likely causing recurrent Kidney stones.   You are scheduled on Friday 8/31 at 10:20AM at [facility] Room [id number]. Please call and try to make earlier appointment.

## 2018-07-26 NOTE — CHART NOTE - NSCHARTNOTEFT_GEN_A_CORE
Patient seen and exmained; Doing well; s/p Cysto and Lithotripsy and stone extraction without stent. d/w Dr. Wei;   Patient had mild pain rin RLQ but otherwise no complaints; ate well.     Vital Signs Last 24 Hrs  T(C): 36.6 (26 Jul 2018 14:21), Max: 36.9 (25 Jul 2018 21:00)  T(F): 97.8 (26 Jul 2018 14:21), Max: 98.4 (25 Jul 2018 21:00)  HR: 71 (26 Jul 2018 14:21) (62 - 90)  BP: 96/63 (26 Jul 2018 14:21) (96/63 - 122/79)  RR: 16 (26 Jul 2018 14:21) (13 - 18)  SpO2: 100% (26 Jul 2018 14:21) (95% - 100%)    P/E:  Neuro: AAO x3; No focal deficits  Gait: Balanced  CVS: S1S2 presnt, regular  Resp: BLAE+, No wheeze or Rhonchi  GI: Soft, BS++, mild tenderness RLQ, ND  Extr: No edema or calf tenderness B/L LE    Labs:                        9.0    6.8   )-----------( 364      ( 26 Jul 2018 11:26 )             30.2   07-26    141  |  111<H>  |  13  ----------------------------<  94  4.1   |  24  |  0.66    Ca    10.7<H>      26 Jul 2018 11:26  Phos  1.9     07-26  Mg     2.0     07-26 Patient seen and examined; Doing well; s/p Cysto and Lithotripsy and stone extraction without stent. d/w Dr. Wei;   Patient had mild pain in RLQ but otherwise no complaints; ate well.     Vital Signs Last 24 Hrs  T(C): 36.6 (26 Jul 2018 14:21), Max: 36.9 (25 Jul 2018 21:00)  T(F): 97.8 (26 Jul 2018 14:21), Max: 98.4 (25 Jul 2018 21:00)  HR: 71 (26 Jul 2018 14:21) (62 - 90)  BP: 96/63 (26 Jul 2018 14:21) (96/63 - 122/79)  RR: 16 (26 Jul 2018 14:21) (13 - 18)  SpO2: 100% (26 Jul 2018 14:21) (95% - 100%)    P/E:  Neuro: AAO x3; No focal deficits  Gait: Balanced  CVS: S1S2 present, regular  Resp: BLAE+, No wheeze or Rhonchi  GI: Soft, BS++, mild tenderness RLQ, ND  Extr: No edema or calf tenderness B/L LE    Labs:                        9.0    6.8   )-----------( 364      ( 26 Jul 2018 11:26 )             30.2   07-26    141  |  111<H>  |  13  ----------------------------<  94  4.1   |  24  |  0.66    Ca    10.7<H>      26 Jul 2018 11:26  Phos  1.9     07-26  Mg     2.0     07-26    D/D:  Symptomatic Anemia due to likely Iron deficiency due to Menorrhagia due to excessive Menstrual loss from possible Fibroid  Right Ureteral obstructing stone with Moderate Hydronephrosis s/p Cysto, Lithotripsy and Stone removal POD#1  Hypercalcemia and Renal Stones likely  due to Hyperparathyroidism stable  Leucocytosis reactive due to Anemia resolved  Overweight    Plan:  Venofer x 1 dose given; Slow Fe daily  H/H stable; appropriately elvated with 2 units now     d/w  Joi to arrange outpatient appointment at [facility]. She will also need Parathyroidectomy. SW obtained appt; patient known to Gary.   Exercise and lifestyle modifications    Discussed with patient findings and plan of care with daughter at bedside; Reviewed outpatient follow up on August 31st at [facility] and stressed need to follow up with Gyn for Myomectomy/ Hystrectomy as well as with H&N Sx for Parathyroidectomy to prevent future complications of Hypercalcemia and recurrent stones; Advised to take Slow Fe once daily OTC as well as Vitamin D once a week therapeutic for 4 weeks she will pay and then f/u levels. She was advised to take 1000 units daily OTC once levels more than 30;   Discussed with RN   Discussed with PGY1 Dr. Thomas and PGY2 Dr. Price  Discussed with Urology Dr. Wei; advised to continue Flomax for a week to 10 days and then can stop. d/w Patient and daughter

## 2018-07-26 NOTE — DISCHARGE NOTE ADULT - CARE PLAN
Principal Discharge DX:	Nephrolithiasis  Goal:	no stone  Assessment and plan of treatment:	Your CT A/P: 0.4 cm distal right ureter stone causing moderate right hydronephrosis. Small bilateral non-obstructing renal calculi measuring up to 0.3 cm in the right lower pole and 0.2 cm in the left lower pole. Several small renal cysts, we continued with  Flomax + IV hydration + pain management, UA negative, infection was ruled out, urology consulted for cystoscopy, Right ureteroscopy, laser lithotripsy and right stone extraction done. Follow up biopsy--------  You are recommended to  Secondary Diagnosis:	Symptomatic anemia  Secondary Diagnosis:	Uterine polyp  Secondary Diagnosis:	Hyperparathyroidism Principal Discharge DX:	Nephrolithiasis  Goal:	no stone  Assessment and plan of treatment:	Your CT A/P: 0.4 cm distal right ureter stone causing moderate right hydronephrosis. Small bilateral non-obstructing renal calculi measuring up to 0.3 cm in the right lower pole and 0.2 cm in the left lower pole. Several small renal cysts, we continued with  Flomax + IV hydration + pain management, UA negative, infection was ruled out, urology consulted for cystoscopy, Right ureteroscopy, laser lithotripsy and right stone extraction done. Follow up biopsy--------  You are recommended to take medication as advised, drink  Secondary Diagnosis:	Symptomatic anemia  Secondary Diagnosis:	Uterine polyp  Secondary Diagnosis:	Hyperparathyroidism Principal Discharge DX:	Nephrolithiasis  Goal:	no stone  Assessment and plan of treatment:	Your CT A/P: 0.4 cm distal right ureter stone causing moderate right hydronephrosis. Small bilateral non-obstructing renal calculi measuring up to 0.3 cm in the right lower pole and 0.2 cm in the left lower pole. Several small renal cysts, we continued with  Flomax + IV hydration + pain management, UA negative, infection was ruled out, urology consulted for cystoscopy, Right ureteroscopy, laser lithotripsy and right stone extraction done. Follow up with biopsy results as outpatient basis with urologist.   You are recommended to take medication as advised, drink a lot of fluids, avoid dehydration.   Follow up with PCP in 1 week. You are recommended to have Parathyroidectomy.  Secondary Diagnosis:	Symptomatic anemia  Assessment and plan of treatment:	For Symptomatic anemia ( with Hx uterine polyps, heavy menstrual periods  H/H: 6.8/24.4; MCV: 63.4, MCH: 17.7 and RDW: 15.5, Serum ferritin: 3, Fe:13, unsaturated iron binding capacity of 455, total binding capacity: 468, percentage saturation: 3 and CT A/P: fibroid uterus) pt received 2 units of PRBC, repeat HB was----. Her anemia is most likley because of blood loss because of heavy menstrual flow.  Joi consulted to arrange outpatient appointment at [facility] for myomectomy.  You are recommended to take medications as advised, green leafy vegetables, follow up with OBGYN for surgery for fibroids. Follow up with PCP in 1 week.  Secondary Diagnosis:	Uterine polyp  Assessment and plan of treatment:	Your CT scan : showed fibroid uterus. Follow up with OBGYN for myomectomy. Fibroids is the most likely cause of heavy menstrual flow and anemia. Take medications as advised. Follow up with PCP in 1 week  Secondary Diagnosis:	Hyperparathyroidism  Assessment and plan of treatment:	You presented with PTH: 152 and calcium of 11.1 and nephrolithiasis, most likely because of Hyperparathyroidism. You are recommended to take medication as advised and follow up with PCP in 1 week and you are recommended to get parathyroidectomy. Principal Discharge DX:	Nephrolithiasis  Goal:	Prevent recurrent stone formation  Assessment and plan of treatment:	You presented to hospital with Right flank pain;   Your CT Scan of Abdomen found to have 0.4 cm distal right ureter stone causing moderate right hydronephrosis (swelling of the kidneys). Small bilateral non-obstructing renal calculi measuring up to 0.3 cm in the right lower pole and 0.2 cm in the left lower pole. Several small renal cysts, We treated you with Flomax (to relax smooth muscle in ureter) + Intravenous hydration with fluids + pain management, Urinalysis negative, infection was ruled out. Urology Dr. Wei consulted for cystoscopy, Right ureteroscopy, laser lithotripsy and right stone extraction done. Follow up with outpatient basis with urologist.   You are recommended to take medication as advised, drink a lot of fluids, avoid dehydration.   Follow up with PCP in 1 week. You are recommended to have Parathyroidectomy.  Secondary Diagnosis:	Symptomatic anemia  Goal:	Target Hemoglobin more than 10gm.  Assessment and plan of treatment:	You also had some Shortness of Breath found to habe low Hemoglobin to 6gms on admission.   For Symptomatic anemia ( with Hx uterine polyps, heavy menstrual periods  H/H: 6.8/24.4; MCV: 63.4, MCH: 17.7 and RDW: 15.5, Serum ferritin: 3 (very low), Serum Iron:13 (very low),  Total Iron binding capacity: 468 (elevated), percentage saturation: 3 (low) all consistent with Iron deficiency anemia. and CT Abdomen and Pelvis: shows fibroid uterus pt received 2 units of PRBC, repeat HB was----. Her Iron deficiency anemia is most likely because of blood loss because of heavy menstrual flow.  Joi consulted to arrange outpatient appointment at [facility] for her routine follow up with PCP as well as referral to Gyn for evaluation for Myomectomy versus Hysterectomy. You also needs surgery to remove parathyroid gland   You are recommended to take medications as advised, green leafy vegetables, follow up with OBGYN for surgery for fibroids. Follow up with PCP in 1 week.  Secondary Diagnosis:	Uterine polyp  Assessment and plan of treatment:	Your CT scan : showed fibroid uterus. Follow up with OBGYN for myomectomy. Fibroids is the most likely cause of heavy menstrual flow and anemia. Take medications as advised. Follow up with PCP in 1 week  Secondary Diagnosis:	Hyperparathyroidism  Goal:	Maintain Calcium levels normal limits and prevent stones and complications  Assessment and plan of treatment:	You presented with PTH: 152 and calcium of 11.1 and nephrolithiasis, most likely because of Hyperparathyroidism (which you have been told in the past and recommended surgery. You are recommended to take medication as advised and follow up with PCP in 1 week and you are recommended to get parathyroidectomy with a Head and Neck Surgeon which is likely causing recurrent Kidney stones. Principal Discharge DX:	Nephrolithiasis  Goal:	Prevent recurrent stone formation  Assessment and plan of treatment:	You presented to hospital with Right flank pain;   Your CT Scan of Abdomen found to have 0.4 cm distal right ureter stone causing moderate right hydronephrosis (swelling of the kidneys). Small bilateral non-obstructing renal calculi measuring up to 0.3 cm in the right lower pole and 0.2 cm in the left lower pole. Several small renal cysts, We treated you with Flomax (to relax smooth muscle in ureter) + Intravenous hydration with fluids + pain management, Urinalysis negative, infection was ruled out. Urology Dr. Wei consulted for cystoscopy, Right ureteroscopy, laser lithotripsy and right stone extraction done. Follow up with outpatient basis with urologist.   You are recommended to take medication as advised, drink a lot of fluids, avoid dehydration.   Follow up with PCP in 1 week. You are recommended to have Parathyroidectomy.  Secondary Diagnosis:	Symptomatic anemia  Goal:	Target Hemoglobin more than 10gm.  Assessment and plan of treatment:	You also had some Shortness of Breath found to have low Hemoglobin to 6gms on admission.   For Symptomatic anemia ( with Hx uterine polyps, heavy menstrual periods  H/H: 6.8/24.4; MCV: 63.4, MCH: 17.7 and RDW: 15.5, Serum ferritin: 3 (very low), Serum Iron:13 (very low),  Total Iron binding capacity: 468 (elevated), percentage saturation: 3 (low) all consistent with Iron deficiency anemia. and CT Abdomen and Pelvis: shows fibroid uterus pt received 2 units of PRBC, repeat HB was----. Her Iron deficiency anemia is most likely because of blood loss because of heavy menstrual flow.  Joi consulted to arrange outpatient appointment at [facility] for her routine follow up with PCP as well as referral to Gyn for evaluation for Myomectomy versus Hysterectomy. You also needs surgery to remove parathyroid gland   You are recommended to take medications as advised, green leafy vegetables, follow up with OBGYN for surgery for fibroids. Follow up with PCP in 1 week.  Secondary Diagnosis:	Uterine polyp  Assessment and plan of treatment:	Your CT scan : showed fibroid uterus. Follow up with OB/GYN for myomectomy. Fibroids is the most likely cause of heavy menstrual flow and anemia. Take medications as advised. Follow up with PCP in 1 week  Secondary Diagnosis:	Hyperparathyroidism  Goal:	Maintain Calcium levels normal limits and prevent stones and complications  Assessment and plan of treatment:	You presented with PTH: 152 and calcium of 11.1 and nephrolithiasis, most likely because of Hyperparathyroidism (which you have been told in the past and recommended surgery. You are recommended to take medication as advised and follow up with PCP in 1 week and you are recommended to get parathyroidectomy with a Head and Neck Surgeon which is likely causing recurrent Kidney stones.  Secondary Diagnosis:	Vitamin D deficiency  Goal:	Target Vitamin D more than 30;  Assessment and plan of treatment:	Your 25 Hydroxy Vitamin D was found to be low at 19; You are advised to take Vitamin D once a week for 6 weeks; Repeat levels with PCP in 6 weeks. Once levels more than 30, reduce to 1000 units daily. If you are unable to follow up with [facility] in time continue to take 1000 units daily. Principal Discharge DX:	Nephrolithiasis  Goal:	Prevent recurrent stone formation  Assessment and plan of treatment:	You presented to hospital with Right flank pain; Your CT Scan of Abdomen found to have 0.4 cm distal right ureter stone causing moderate right hydronephrosis (swelling of the kidneys). Small bilateral non-obstructing renal calculi measuring up to 0.3 cm in the right lower pole and 0.2 cm in the left lower pole. Several small renal cysts, We treated you with Flomax (to relax smooth muscle in ureter) + Intravenous hydration with fluids + pain management, Urinalysis negative, infection was ruled out. Urology Dr. Wei consulted for cystoscopy, Right ureteroscopy, laser lithotripsy and right stone extraction done. Follow up with outpatient basis with urologist.   You are recommended to take medication as advised, drink a lot of fluids, avoid dehydration.   Follow up with PCP in 1 week. You are recommended to have Parathyroidectomy. You are scheduled on Friday 8/31 at 10:20AM at [facility] Room D1-55. Please call and try to make earlier appointment.  Secondary Diagnosis:	Symptomatic anemia  Goal:	Target Hemoglobin more than 10gm.  Assessment and plan of treatment:	You also had some Shortness of Breath found to have low Hemoglobin to 6gms on admission.   For Symptomatic anemia ( with Hx uterine polyps, heavy menstrual periods  H/H: 6.8/24.4; MCV: 63.4, MCH: 17.7 and RDW: 15.5, Serum ferritin: 3 (very low), Serum Iron:13 (very low),  Total Iron binding capacity: 468 (elevated), percentage saturation: 3 (low) all consistent with Iron deficiency anemia. and CT Abdomen and Pelvis: shows fibroid uterus pt received 2 units of PRBC, repeat HB was 9.0. Her Iron deficiency anemia is most likely because of blood loss because of heavy menstrual flow.  Joi consulted to arrange outpatient appointment at [facility] for her routine follow up with PCP as well as referral to Gyn for evaluation for Myomectomy versus Hysterectomy. You also needs surgery to remove parathyroid gland   You are recommended to take medications as advised, green leafy vegetables, follow up with OBGYN for surgery for fibroids. Follow up with PCP in 1 week.   You are scheduled on Friday 8/31 at 10:20AM at [facility] Room D1-55. Please call and try to make earlier appointment.  Secondary Diagnosis:	Uterine polyp  Assessment and plan of treatment:	Your CT scan : showed fibroid uterus. Follow up with OB/GYN for myomectomy. Fibroids is the most likely cause of heavy menstrual flow and anemia. Take medications as advised. Follow up with PCP in 1 week   You are scheduled on Friday 8/31 at 10:20AM at [facility] Room D1-55. Please call and try to make earlier appointment.  Secondary Diagnosis:	Hyperparathyroidism  Goal:	Maintain Calcium levels normal limits and prevent stones and complications  Assessment and plan of treatment:	You presented with PTH: 152 and calcium of 11.1 and nephrolithiasis, most likely because of Hyperparathyroidism (which you have been told in the past and recommended surgery. You are recommended to take medication as advised and follow up with PCP in 1 week and you are recommended to get parathyroidectomy with a Head and Neck Surgeon which is likely causing recurrent Kidney stones.   You are scheduled on Friday 8/31 at 10:20AM at [facility] Room D1-55. Please call and try to make earlier appointment.  Secondary Diagnosis:	Vitamin D deficiency  Goal:	Target Vitamin D more than 30;  Assessment and plan of treatment:	Your 25 Hydroxy Vitamin D was found to be low at 19; You are advised to take Vitamin D once a week for 6 weeks; Repeat levels with PCP in 6 weeks. Once levels more than 30, reduce to 1000 units daily. If you are unable to follow up with [facility] in time continue to take 1000 units daily.

## 2018-07-26 NOTE — DISCHARGE NOTE ADULT - MEDICATION SUMMARY - MEDICATIONS TO TAKE
I will START or STAY ON the medications listed below when I get home from the hospital:    tamsulosin 0.4 mg oral capsule  -- 1 cap(s) by mouth once a day (at bedtime)  -- Indication: For Help in passing stone, relax ureter    Slow Fe (as elemental iron) 45 mg oral tablet, extended release  -- 1 tab(s) by mouth once a day  -- Indication: For Fe supplement for anemia     Multiple Vitamins oral capsule  -- 1 cap(s) by mouth once a day  -- Indication: For Supplements    ergocalciferol 50,000 intl units (1.25 mg) oral capsule  -- 1 cap(s) by mouth once a week   -- Indication: For Suupplements

## 2018-08-01 LAB — COMPN STONE: SIGNIFICANT CHANGE UP

## 2019-09-13 NOTE — ED ADULT NURSE NOTE - ED STAT RN HAND OFF
I reviewed the H&P, I examined the patient, and there are no changes in the patient's condition.     Handoff
